# Patient Record
Sex: MALE | Race: BLACK OR AFRICAN AMERICAN | Employment: UNEMPLOYED | URBAN - METROPOLITAN AREA
[De-identification: names, ages, dates, MRNs, and addresses within clinical notes are randomized per-mention and may not be internally consistent; named-entity substitution may affect disease eponyms.]

---

## 2017-06-06 ENCOUNTER — OFFICE VISIT (OUTPATIENT)
Dept: INTERNAL MEDICINE CLINIC | Age: 27
End: 2017-06-06

## 2017-06-06 VITALS
RESPIRATION RATE: 17 BRPM | SYSTOLIC BLOOD PRESSURE: 120 MMHG | HEIGHT: 72 IN | TEMPERATURE: 97.9 F | BODY MASS INDEX: 25.6 KG/M2 | DIASTOLIC BLOOD PRESSURE: 70 MMHG | WEIGHT: 189 LBS | OXYGEN SATURATION: 100 % | HEART RATE: 63 BPM

## 2017-06-06 DIAGNOSIS — S33.5XXA LOW BACK SPRAIN, INITIAL ENCOUNTER: Primary | ICD-10-CM

## 2017-06-06 RX ORDER — NAPROXEN 500 MG/1
500 TABLET ORAL 2 TIMES DAILY WITH MEALS
Qty: 60 TAB | Refills: 3 | Status: SHIPPED | OUTPATIENT
Start: 2017-06-06 | End: 2019-12-10 | Stop reason: SDUPTHER

## 2017-06-06 RX ORDER — CYCLOBENZAPRINE HCL 10 MG
10 TABLET ORAL 2 TIMES DAILY
Qty: 60 TAB | Refills: 3 | Status: SHIPPED | OUTPATIENT
Start: 2017-06-06 | End: 2019-12-10 | Stop reason: SDUPTHER

## 2017-06-06 NOTE — MR AVS SNAPSHOT
Visit Information Date & Time Provider Department Dept. Phone Encounter #  
 6/6/2017  7:45 AM MD Ranjith Freeman Luis Davila [phone] [id number] Follow-up Instructions Return in about 2 weeks (around 6/20/2017), or if symptoms worsen or fail to improve. Upcoming Health Maintenance Date Due Pneumococcal 19-64 Medium Risk (1 of 1 - PPSV23) 4/7/2007 DTaP/Tdap/Td series (1 - Tdap) 4/7/2009 INFLUENZA AGE 9 TO ADULT 8/1/2017 Allergies as of 6/6/2017  Review Complete On: 6/6/2017 By: Precious Chino LPN No Known Allergies Current Immunizations  Never Reviewed No immunizations on file. Not reviewed this visit You Were Diagnosed With   
  
 Codes Comments Low back sprain, initial encounter    -  Primary ICD-10-CM: S33. 9XXA ICD-9-CM: 376. 9 Vitals BP Pulse Temp Resp Height(growth percentile) Weight(growth percentile) 120/70 63 97.9 °F (36.6 °C) (Oral) 17 6' (1.829 m) 189 lb (85.7 kg) SpO2 BMI Smoking Status 100% 25.63 kg/m2 Current Every Day Smoker BMI and BSA Data Body Mass Index Body Surface Area  
 25.63 kg/m 2 2.09 m 2 Your Updated Medication List  
  
   
This list is accurate as of: 6/6/17  8:30 AM.  Always use your most recent med list.  
  
  
  
  
 cyclobenzaprine 10 mg tablet Commonly known as:  FLEXERIL Take 1 Tab by mouth two (2) times a day. naproxen 500 mg tablet Commonly known as:  NAPROSYN Take 1 Tab by mouth two (2) times daily (with meals). Prescriptions Printed Refills  
 naproxen (NAPROSYN) 500 mg tablet 3 Sig: Take 1 Tab by mouth two (2) times daily (with meals). Class: Print Route: Oral  
 cyclobenzaprine (FLEXERIL) 10 mg tablet 3 Sig: Take 1 Tab by mouth two (2) times a day. Class: Print Route: Oral  
  
We Performed the Following REFERRAL TO PHYSICAL THERAPY [VTD90 Custom] Follow-up Instructions Return in about 2 weeks (around 2017), or if symptoms worsen or fail to improve. To-Do List   
 2017 Imaging:  XR SPINE LUMB MIN 4 V Referral Information Referral ID Referred By Referred To [id number] Terrence Cottrell Ottoniel 1213, PT, DPT   
   [address] [facility] [place] Phone: [phone] Fax: [phone] Visits Status Start Date End Date 1 New Request 17 If your referral has a status of pending review or denied, additional information will be sent to support the outcome of this decision. Patient Instructions [employer]hart Activation Thank you for requesting access to WEbook. Please follow the instructions below to securely access and download your online medical record. WEbook allows you to send messages to your doctor, view your test results, renew your prescriptions, schedule appointments, and more. How Do I Sign Up? 1. In your internet browser, go to [email or web address] 
2. Click on the First Time User? Click Here link in the Sign In box. You will be redirect to the New Member Sign Up page. 3. Enter your WEbook Access Code exactly as it appears below. You will not need to use this code after youve completed the sign-up process. If you do not sign up before the expiration date, you must request a new code. WEbook Access Code: [email or web address] Expires: 2017  8:00 AM (This is the date your WEbook access code will ) 4. Enter the last four digits of your Social Security Number (xxxx) and Date of Birth (mm/dd/yyyy) as indicated and click Submit. You will be taken to the next sign-up page. 5. Create a WEbook ID. This will be your WEbook login ID and cannot be changed, so think of one that is secure and easy to remember. 6. Create a WEbook password. You can change your password at any time. 7. Enter your Password Reset Question and Answer. This can be used at a later time if you forget your password. 8. Enter your e-mail address. You will receive e-mail notification when new information is available in [address] 9. Click Sign Up. You can now view and download portions of your medical record. 10. Click the Download Summary menu link to download a portable copy of your medical information. Additional Information If you have questions, please visit the Frequently Asked Questions section of the [employer] website at [email or web address]/. Remember, [employer] is NOT to be used for urgent needs. For medical emergencies, dial 911. Introducing [facility]! [facility] introduces [employer] patient portal. Now you can access parts of your medical record, email your doctor's office, and request medication refills online. 1. In your internet browser, go to [email or web address] 2. Click on the First Time User? Click Here link in the Sign In box. You will see the New Member Sign Up page. 3. Enter your [employer] Access Code exactly as it appears below. You will not need to use this code after youve completed the sign-up process. If you do not sign up before the expiration date, you must request a new code. · [employer] Access Code: [email or web address] Expires: 9/4/2017  8:00 AM 
 
4. Enter the last four digits of your Social Security Number (xxxx) and Date of Birth (mm/dd/yyyy) as indicated and click Submit. You will be taken to the next sign-up page. 5. Create a [employer] ID. This will be your [employer] login ID and cannot be changed, so think of one that is secure and easy to remember. 6. Create a [employer] password. You can change your password at any time. 7. Enter your Password Reset Question and Answer. This can be used at a later time if you forget your password. 8. Enter your e-mail address.  You will receive e-mail notification when new information is available in [employer]. 9. Click Sign Up. You can now view and download portions of your medical record. 10. Click the Download Summary menu link to download a portable copy of your medical information. If you have questions, please visit the Frequently Asked Questions section of the [employer] website. Remember, [employer] is NOT to be used for urgent needs. For medical emergencies, dial 911. Now available from your iPhone and Android! Please provide this summary of care documentation to your next provider. Your primary care clinician is listed as NONE. If you have any questions after today's visit, please call [phone].
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
## 2017-06-06 NOTE — PATIENT INSTRUCTIONS
AMDL Activation    Thank you for requesting access to AMDL. Please follow the instructions below to securely access and download your online medical record. AMDL allows you to send messages to your doctor, view your test results, renew your prescriptions, schedule appointments, and more. How Do I Sign Up? 1. In your internet browser, go to [email or web address]  2. Click on the First Time User? Click Here link in the Sign In box. You will be redirect to the New Member Sign Up page. 3. Enter your AMDL Access Code exactly as it appears below. You will not need to use this code after youve completed the sign-up process. If you do not sign up before the expiration date, you must request a new code. AMDL Access Code: [email or web address]  Expires: 2017  8:00 AM (This is the date your AMDL access code will )    4. Enter the last four digits of your Social Security Number (xxxx) and Date of Birth (mm/dd/yyyy) as indicated and click Submit. You will be taken to the next sign-up page. 5. Create a AMDL ID. This will be your AMDL login ID and cannot be changed, so think of one that is secure and easy to remember. 6. Create a AMDL password. You can change your password at any time. 7. Enter your Password Reset Question and Answer. This can be used at a later time if you forget your password. 8. Enter your e-mail address. You will receive e-mail notification when new information is available in [address] 9. Click Sign Up. You can now view and download portions of your medical record. 10. Click the Download Summary menu link to download a portable copy of your medical information. Additional Information    If you have questions, please visit the Frequently Asked Questions section of the AMDL website at [email or web address] Remember, AMDL is NOT to be used for urgent needs. For medical emergencies, dial 911.

## 2017-06-22 ENCOUNTER — HOSPITAL ENCOUNTER (OUTPATIENT)
Dept: PHYSICAL THERAPY | Age: 27
Discharge: HOME | End: 2017-06-22
Payer: SELF-PAY

## 2017-06-22 PROCEDURE — 97014 ELECTRIC STIMULATION THERAPY: CPT | Performed by: PHYSICAL THERAPIST

## 2017-06-22 PROCEDURE — 97140 MANUAL THERAPY 1/> REGIONS: CPT | Performed by: PHYSICAL THERAPIST

## 2017-06-22 PROCEDURE — 97161 PT EVAL LOW COMPLEX 20 MIN: CPT | Performed by: PHYSICAL THERAPIST

## 2017-06-22 PROCEDURE — 97110 THERAPEUTIC EXERCISES: CPT | Performed by: PHYSICAL THERAPIST
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
## 2017-06-27 ENCOUNTER — HOSPITAL ENCOUNTER (OUTPATIENT)
Dept: PHYSICAL THERAPY | Age: 27
Discharge: HOME | End: 2017-06-27
Payer: SELF-PAY

## 2017-06-27 PROCEDURE — 97014 ELECTRIC STIMULATION THERAPY: CPT

## 2017-06-27 PROCEDURE — 97110 THERAPEUTIC EXERCISES: CPT

## 2017-06-27 PROCEDURE — 97140 MANUAL THERAPY 1/> REGIONS: CPT

## 2017-06-27 NOTE — PROGRESS NOTES
PT DAILY TREATMENT NOTE - [facility] 215    Patient Name: Colleen Campbell  Date:2017  : 1990  [x]  Patient  Verified  Payor: SELF PAY / Plan: [facility] SELF PAY / Product Type: Self Pay /    In time:1:25P  Out time: 2:45P  Total Treatment Time (min): 80  Total Timed Codes (min): 65  1:1 Treatment Time ( only): --   Visit #: 2     Treatment Area: Low back ache [M54.5]    SUBJECTIVE  Pain Level (0-10 scale): -8/10  Any medication changes, allergies to medications, adverse drug reactions, diagnosis change, or new procedure performed?: [x] No    [] Yes (see summary sheet for update)  Subjective functional status/changes:   [] No changes reported  \"I was really sore over the weekend from last week. I feel a little better today but my pain has not improved any.  I have been doing my exercises 2x per day\"    OBJECTIVE    Modality rationale: decrease inflammation, decrease pain and increase tissue extensibility to improve the patients ability to decrease LBP   Min Type Additional Details   15 post [x] Estim: []Att   []Unatt        []TENS instruct                  [x]IFC  []Premod   []NMES                     []Other:  []w/US   []w/ice   [x]w/heat  Position: supine with bolster  Location: back    []  Traction: [] Cervical       []Lumbar                       [] Prone          []Supine                       []Intermittent   []Continuous Lbs:  [] before manual  [] after manual  []w/heat    []  Ultrasound: []Continuous   [] Pulsed at:                           []1MHz   []3MHz Location:  W/cm2:    [] Paraffin         Location:   []w/heat    []  Ice     []  Heat  []  Ice massage Position:  Location:    []  Laser  []  Other: Position:  Location:      []  Vasopneumatic Device Pressure:       [] lo [] med [] hi   Temperature:      [x] Skin assessment post-treatment:  [x]intact []redness- no adverse reaction    []redness  adverse reaction:     50 min Therapeutic Exercise:  [x] See flow sheet :   Rationale: increase ROM, increase strength and improve coordination to improve the patients ability to increase tolerance for ADLs    15 min Manual Therapy: Manual STM to bilateral lumbar paraspinals in prone. Rationale: decrease pain, increase ROM, increase tissue extensibility and decrease trigger points to improve the patients ability to increase lumbar mobility          With   [] TE   [] TA   [] neuro   [] other: Patient Education: [x] Review HEP    [] Progressed/Changed HEP based on:   [] positioning   [] body mechanics   [] transfers   [] heat/ice application    [] other:      Other Objective/Functional Measures: --     Pain Level (0-10 scale) post treatment: 7-8/10    ASSESSMENT/Changes in Function:   Pt reported no decrease in pain with today's session. Pt remained guarded during manual therapy and required frequent VC on relaxing. Patient will continue to benefit from skilled PT services to modify and progress therapeutic interventions, address functional mobility deficits, address ROM deficits, address strength deficits, analyze and address soft tissue restrictions, analyze and cue movement patterns and analyze and modify body mechanics/ergonomics to attain remaining goals. [x]  See Plan of Care  []  See progress note/recertification  []  See Discharge Summary         Progress towards goals / Updated goals:  Short Term Goals: To be accomplished in 3 weeks:  1) Pt will be Independent with HEP  2) Pt will be able to Sit greater than 30 minutes with pain less than 3/10        Long Term Goals:  To be accomplished in 6 weeks:  1) Pt will be able to Sit greater than 60 minutes without pain  2) Pt will be able to Stand greater than 60 minutes without increase of pain  3) Pt will be able to retrieve item form ground without pain  4)  Pt will be able to carry >/= 20 lbs without pain (to simulate carrying his 3year old)       PLAN  [x]  Upgrade activities as tolerated     [x]  Continue plan of care  []  Update interventions per flow sheet       []  Discharge due to:_  []  Other:_      Abdifatah Corona, PTA 6/27/2017  1:25 PM

## 2017-06-29 ENCOUNTER — HOSPITAL ENCOUNTER (OUTPATIENT)
Dept: PHYSICAL THERAPY | Age: 27
Discharge: HOME | End: 2017-06-29
Payer: SELF-PAY

## 2017-06-29 PROCEDURE — 97140 MANUAL THERAPY 1/> REGIONS: CPT | Performed by: PHYSICAL THERAPIST

## 2017-06-29 PROCEDURE — 97014 ELECTRIC STIMULATION THERAPY: CPT | Performed by: PHYSICAL THERAPIST

## 2017-06-29 PROCEDURE — 97110 THERAPEUTIC EXERCISES: CPT | Performed by: PHYSICAL THERAPIST

## 2017-06-29 NOTE — PROGRESS NOTES
PT DAILY TREATMENT NOTE 2-15    Patient Name: Asuncion Mas  Date:2017  : 1990  [x]  Patient  Verified  Payor: SELF PAY / Plan: [facility] SELF PAY / Product Type: Self Pay /    In time: 1:35p  Out time: 2:40p  Total Treatment Time (min): 65  Visit #: 3     Treatment Area: Low back ache [M54.5]    SUBJECTIVE  Pain Level (0-10 scale): 7/10  Any medication changes, allergies to medications, adverse drug reactions, diagnosis change, or new procedure performed?: [x] No    [] Yes (see summary sheet for update)  Subjective functional status/changes:   [] No changes reported  Increased back pain today after lifting at work last night 75 lbs. Enquired about a note.      OBJECTIVE              Modality rationale: decrease inflammation, decrease pain and increase tissue extensibility to improve the patients ability to decrease LBP   Min Type Additional Details   15 post [x] Estim: []Att   []Unatt        []TENS instruct                  [x]IFC  []Premod   []NMES                     []Other:  []w/US   [x]w/ice   []w/heat  Position: supine with bolster  Location: back     []  Traction: [] Cervical       []Lumbar                       [] Prone          []Supine                       []Intermittent   []Continuous Lbs:  [] before manual  [] after manual  []w/heat     []  Ultrasound: []Continuous   [] Pulsed at:                           []1MHz   []3MHz Location:  W/cm2:     [] Paraffin      Location:   []w/heat     []  Ice     []  Heat  []  Ice massage Position:  Location:     []  Laser  []  Other: Position:  Location:     []  Vasopneumatic Device Pressure:       [] lo [] med [] hi   Temperature:       [x] Skin assessment post-treatment:  [x]intact []redness- no adverse reaction    []redness  adverse reaction:      45 min Therapeutic Exercise:  [x] See flow sheet :   Rationale: increase ROM, increase strength and improve coordination to improve the patients ability to increase tolerance for ADLs     15 min Manual Therapy: Manual STM to bilateral lumbar paraspinals in prone. Rationale: decrease pain, increase ROM, increase tissue extensibility and decrease trigger points to improve the patients ability to increase lumbar mobility      With   [] TE   [] TA   [] neuro   [] other: Patient Education: [x] Review HEP    [] Progressed/Changed HEP based on:   [] positioning   [] body mechanics   [] transfers   [] heat/ice application    [] other:       Other Objective/Functional Measures: --                  Pain Level (0-10 scale) post treatment: 7/10     ASSESSMENT/Changes in Function:   Pt reported no decrease in pain with today's session. Pt remained guarded. Encouraged him to get his x-ray.    Patient will continue to benefit from skilled PT services to modify and progress therapeutic interventions, address functional mobility deficits, address ROM deficits, address strength deficits, analyze and address soft tissue restrictions, analyze and cue movement patterns and analyze and modify body mechanics/ergonomics to attain remaining goals.      [x]  See Plan of Care  []  See progress note/recertification  []  See Discharge Summary      Progress towards goals / Updated goals:  Short Term Goals: To be accomplished in 3 weeks:  1) Pt will be Independent with HEP  2) Pt will be able to Sit greater than 30 minutes with pain less than 3/10          Long Term Goals: To be accomplished in 6 weeks:  1) Pt will be able to Sit greater than 60 minutes without pain  2) Pt will be able to Stand greater than 60 minutes without increase of pain  3) Pt will be able to retrieve item form ground without pain  4)  Pt will be able to carry >/= 20 lbs without pain (to simulate carrying his 3year old)        PLAN  [x]  Upgrade activities as tolerated     [x]  Continue plan of care  []  Update interventions per flow sheet       []  Discharge due to:_  []  Other:_      Burgess Vasques 6/29/2017  3:47 PM

## 2017-07-03 ENCOUNTER — HOSPITAL ENCOUNTER (OUTPATIENT)
Dept: GENERAL RADIOLOGY | Age: 27
Discharge: HOME | End: 2017-07-03
Attending: INTERNAL MEDICINE
Payer: SELF-PAY

## 2017-07-03 DIAGNOSIS — S33.5XXA LOW BACK SPRAIN, INITIAL ENCOUNTER: ICD-10-CM

## 2017-07-03 PROCEDURE — 72100 X-RAY EXAM L-S SPINE 2/3 VWS: CPT

## 2017-07-05 ENCOUNTER — HOSPITAL ENCOUNTER (OUTPATIENT)
Dept: PHYSICAL THERAPY | Age: 27
Discharge: HOME | End: 2017-07-05
Payer: SELF-PAY

## 2017-07-05 PROCEDURE — 97014 ELECTRIC STIMULATION THERAPY: CPT | Performed by: PHYSICAL THERAPIST

## 2017-07-05 PROCEDURE — 97140 MANUAL THERAPY 1/> REGIONS: CPT | Performed by: PHYSICAL THERAPIST

## 2017-07-05 PROCEDURE — 97110 THERAPEUTIC EXERCISES: CPT | Performed by: PHYSICAL THERAPIST

## 2017-07-05 NOTE — PROGRESS NOTES
PT DAILY TREATMENT NOTE 2-15    Patient Name: Bhavin Urenas  Date:2017  : 1990  [x]  Patient  Verified  Payor: SELF PAY / Plan: [facility] SELF PAY / Product Type: Self Pay /    In time: 10:10a Out time:11:20a  Total Treatment Time (min): 70  Visit #: 4     Treatment Area: Low back ache [M54.5]    SUBJECTIVE  Pain Level (0-10 scale):7/10  Any medication changes, allergies to medications, adverse drug reactions, diagnosis change, or new procedure performed?: [x] No    [] Yes (see summary sheet for update)  Subjective functional status/changes:   [] No changes reported  I think you are right, have not had a chance to rest and I think that is making a difference. Pain levels staying about the same. 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 Had x-ray on monday    OBJECTIVE    Modality rationale: decrease inflammation, decrease pain and increase tissue extensibility to improve the patients ability to tolerated daily activities with less pain.     Min Type Additional Details   15 [x] Estim: []Att   []Unatt        []TENS instruct                  [x]IFC  []Premod   []NMES                     []Other:  []w/US   []w/ice   [x]w/heat  Position: prone  Location: lumbar/lower thoracic    []  Traction: [] Cervical       []Lumbar                       [] Prone          []Supine                       []Intermittent   []Continuous Lbs:  [] before manual  [] after manual  []w/heat    []  Ultrasound: []Continuous   [] Pulsed at:                            []1MHz   []3MHz Location:  W/cm2:    []  Paraffin         Location:  []w/heat    []  Ice     []  Heat  []  Ice massage Position:  Location:    []  Laser  []  Other: Position:  Location:    []  Vasopneumatic Device Pressure:       [] lo [] med [] hi   Temperature:    [x] Skin assessment post-treatment:  [x]intact []redness- no adverse reaction    []redness  adverse reaction:     30 min Therapeutic Exercise:  [x] See flow sheet :   Rationale: increase ROM, increase strength, improve balance and increase proprioception to improve the patients ability to tolerate daily and work activities. 15 Min Manual Therapy:  Manual STM to bilateral paraspinals. Rationale: decrease pain, increase ROM, increase tissue extensibility and decrease trigger points  to improve the patients ability to tolerate daily and work related activities. With   [] TE   [] TA   [] neuro   [] other: Patient Education: [x] Review HEP    [] Progressed/Changed HEP based on:   [] positioning   [] body mechanics   [] transfers   [] heat/ice application    [] other:      Other Objective/Functional Measures: -     Pain Level (0-10 scale) post treatment: 7/10    ASSESSMENT/Changes in Function:   Pain levels remain about the same at 7/10. No change with session, but does report that is helps some. X-ray report reveals no abnormalities in the spine. Patient will continue to benefit from skilled PT services to modify and progress therapeutic interventions, address ROM deficits, address strength deficits, analyze and address soft tissue restrictions, analyze and modify body mechanics/ergonomics and assess and modify postural abnormalities to attain remaining goals.      [x]  See Plan of Care  []  See progress note/recertification  []  See Discharge Summary         Progress towards goals / Updated goals:  1) Pt will be able to Sit greater than  60 minutes without pain  2) Pt will be able to Stand greater than  60 minutes without increase of pain  3) Pt will be able to Ambulate greater than 3 blocks without increase of pain  4) Pt will be able to retrieve item form ground without pain  5) Pt will be able to carry >/= 40 lbs without pain        PLAN  []  Upgrade activities as tolerated     [x]  Continue plan of care  []  Update interventions per flow sheet       []  Discharge due to:_  []  Other:_      Estella Hurst 7/5/2017  12:18 PM

## 2017-07-07 ENCOUNTER — APPOINTMENT (OUTPATIENT)
Dept: PHYSICAL THERAPY | Age: 27
End: 2017-07-07
Payer: SELF-PAY

## 2017-07-11 ENCOUNTER — HOSPITAL ENCOUNTER (OUTPATIENT)
Dept: PHYSICAL THERAPY | Age: 27
Discharge: HOME | End: 2017-07-11
Payer: SELF-PAY

## 2017-07-11 PROCEDURE — 97014 ELECTRIC STIMULATION THERAPY: CPT | Performed by: PHYSICAL MEDICINE & REHABILITATION

## 2017-07-11 PROCEDURE — 97110 THERAPEUTIC EXERCISES: CPT | Performed by: PHYSICAL MEDICINE & REHABILITATION

## 2017-07-11 PROCEDURE — 97140 MANUAL THERAPY 1/> REGIONS: CPT | Performed by: PHYSICAL MEDICINE & REHABILITATION

## 2017-07-11 NOTE — PROGRESS NOTES
PT DAILY TREATMENT NOTE - [facility] 2-15    Patient Name: Francisco J Gomes  Date:2017  : 1990  [x]  Patient  Verified  Payor: SELF PAY / Plan: [facility] SELF [facility]Y / Product Type: Self Pay /    In time:215 pm  Out time:330 pm  Total Treatment Time (min): 75  Total Timed Codes (min): 60  1:1 Treatment Time ( only): -   Visit #: 5     Treatment Area: Low back ache [M54.5]    SUBJECTIVE  Pain Level (0-10 scale): 6/10  Any medication changes, allergies to medications, adverse drug reactions, diagnosis change, or new procedure performed?: [x] No    [] Yes (see summary sheet for update)  Subjective functional status/changes:   [] No changes reported  Pt reported he is doing a little better but still having muscle spasms.     OBJECTIVE    Modality rationale: decrease inflammation, decrease pain and increase tissue extensibility to improve the patients ability to ADLs, standing, walking tolerance   Min Type Additional Details   15 [x] Estim: []Att   [x]Unatt        []TENS instruct                  [x]IFC  []Premod   []NMES                     []Other:  []w/US   []w/ice   [x]w/heat  Position: supine  Location: low back    []  Traction: [] Cervical       []Lumbar                       [] Prone          []Supine                       []Intermittent   []Continuous Lbs:  [] before manual  [] after manual  []w/heat    []  Ultrasound: []Continuous   [] Pulsed at:                           []1MHz   []3MHz Location:  W/cm2:    [] Paraffin         Location:   []w/heat    []  Ice     []  Heat  []  Ice massage Position:  Location:    []  Laser  []  Other: Position:  Location:      []  Vasopneumatic Device Pressure:       [] lo [] med [] hi   Temperature:      [x] Skin assessment post-treatment:  [x]intact []redness- no adverse reaction    []redness  adverse reaction:     45 min Therapeutic Exercise:  [x] See flow sheet :   Rationale: increase ROM, increase strength, improve coordination, improve balance and increase proprioception to improve the patients ability to ADLs, standing tolerance    15 min Manual Therapy:  MET to correct L posterior rotated innominate   Rationale: decrease pain, increase ROM and increase tissue extensibility to improve the patients ability to ADLs, standing tolerance          With   [x] TE   [] TA   [] neuro   [] other: Patient Education: [x] Review HEP    [] Progressed/Changed HEP based on:   [] positioning   [] body mechanics   [] transfers   [] heat/ice application    [] other:      Other Objective/Functional Measures:   Pt tolerated today's standing therex well with mod fatigue present due to weakness. Pain Level (0-10 scale) post treatment: 6/10    ASSESSMENT/Changes in Function:     Patient will continue to benefit from skilled PT services to modify and progress therapeutic interventions, address functional mobility deficits, address ROM deficits, address strength deficits, analyze and address soft tissue restrictions, analyze and cue movement patterns, analyze and modify body mechanics/ergonomics and assess and modify postural abnormalities to attain remaining goals. []  See Plan of Care  []  See progress note/recertification  []  See Discharge Summary         Progress towards goals / Updated goals:  Pt demonstrated hip weakness and fatigued very quickly. Would continue to benefit from LE and core stability to allow for improvement in work tasks.     PLAN  [x]  Upgrade activities as tolerated     [x]  Continue plan of care  [x]  Update interventions per flow sheet       []  Discharge due to:_  []  Other:_      Sammi Parrish, JOSE, CPT 7/11/2017  5:07 PM

## 2017-07-18 ENCOUNTER — HOSPITAL ENCOUNTER (OUTPATIENT)
Dept: PHYSICAL THERAPY | Age: 27
Discharge: HOME | End: 2017-07-18
Payer: SELF-PAY

## 2017-07-18 PROCEDURE — 97110 THERAPEUTIC EXERCISES: CPT | Performed by: PHYSICAL THERAPIST

## 2017-07-18 PROCEDURE — 97140 MANUAL THERAPY 1/> REGIONS: CPT | Performed by: PHYSICAL THERAPIST

## 2017-07-18 NOTE — PROGRESS NOTES
PT DAILY TREATMENT NOTE 2-15    Patient Name: Anjana Soraes  Date:2017  : 1990  [x]  Patient  Verified  Payor: SELF PAY / [facility] / Product Type: Self Pay /    In time: 2:00p  Out time: 3:10p  Total Treatment Time (min): 70  Visit #: 5    Treatment Area: Low back ache [M54.5]    SUBJECTIVE  Pain Level (0-10 scale): 610  Any medication changes, allergies to medications, adverse drug reactions, diagnosis change, or new procedure performed?: [x] No    [] Yes (see summary sheet for update)  Subjective functional status/changes:   [] No changes reported  The pt reports pain is about the same since last session. Busy at work that does not help pain levels calm down.      OBJECTIVE           Modality rationale: decrease inflammation, decrease pain and increase tissue extensibility to improve the patients ability to ADLs, standing, walking tolerance   Min Type Additional Details   - [x] Estim: []Att   [x]Unatt        []TENS instruct                  [x]IFC  []Premod   []NMES                     []Other:  []w/US   []w/ice   [x]w/heat  Position: supine  Location: low back     []  Traction: [] Cervical       []Lumbar                       [] Prone          []Supine                       []Intermittent   []Continuous Lbs:  [] before manual  [] after manual  []w/heat     []  Ultrasound: []Continuous   [] Pulsed at:                           []1MHz   []3MHz Location:  W/cm2:     [] Paraffin      Location:   []w/heat    post 10 [x]  Ice     []  Heat  []  Ice massage Position: supine  Location: back     []  Laser  []  Other: Position:  Location:     []  Vasopneumatic Device Pressure:       [] lo [] med [] hi   Temperature:       [x] Skin assessment post-treatment:  [x]intact []redness- no adverse reaction    []redness  adverse reaction:      45 min Therapeutic Exercise:  [x] See flow sheet :   Rationale: increase ROM, increase strength, improve coordination, improve balance and increase proprioception to improve the patients ability to ADLs, standing tolerance     15 min Manual Therapy:  SIJ assessment, symmetrical. STM to left lumbar paraspinals in prone. Rationale: decrease pain, increase ROM and increase tissue extensibility to improve the patients ability to ADLs, standing tolerance      With   [x] TE   [] TA   [] neuro   [] other: Patient Education: [x] Review HEP    [] Progressed/Changed HEP based on:   [] positioning   [] body mechanics   [] transfers   [] heat/ice application    [] other:       Other Objective/Functional Measures:   ROM was much improved with  Flexion to 75%, extension 75% full side bending and 75% rotation.               Pain Level (0-10 scale) post treatment: 0/10 (numb from ice)     ASSESSMENT/Changes in Function:    Pt tolerated addition to med ball walk, prayer stretch and miniband side stepping. Progressed balance and working on core stabilization with squat and strengthening. Improved ROM noted since initial session with muscle guarding noted to be significantly decreased.    Patient will continue to benefit from skilled PT services to modify and progress therapeutic interventions, address functional mobility deficits, address ROM deficits, address strength deficits, analyze and address soft tissue restrictions, analyze and cue movement patterns, analyze and modify body mechanics/ergonomics and assess and modify postural abnormalities to attain remaining goals.      [x]  See Plan of Care  []  See progress note/recertification  []  See Discharge Summary      Progress towards goals / Updated goals:  Would continue to benefit from LE and core stability to allow for improvement in work tasks.     PLAN  [x]  Upgrade activities as tolerated     [x]  Continue plan of care  [x]  Update interventions per flow sheet       []  Discharge due to:_  []  Other:_      Grant Daily 7/18/2017  4:09 PM

## 2017-07-18 NOTE — PROGRESS NOTES
[employer] [facility]  [address]  [facility], [address]  Phone: [phone]      Fax:  [phone]    Progress Note    Name: Jesus Barillas   : 1990   MD: Corrie Greene MD       Treatment Diagnosis: Low back ache [M54.5]  Start of Care: 17    Visits from Start of Care: 5  Missed Visits: 1    Summary of Care: The pt has been seen for 5 sessions at Physical Therapy at [facility]. Pt has progressed with lumbar ROM, progression of core and LE Strengthening, and improved tolerance to daily activity He has decreased muscle turgor and spasms since the initiation of PT. . Pain levels continue to be 6/10, however patient reports minimal sleep, rest, and working 2 jobs that impacts his pain levels. Assessment / Recommendations: Overall, he has made progress in physical therapy and would benefit form 2-4 more weeks. Progress towards goals / Updated goals: Progressing towards goals. Pain levels are still reported to be 6/10  1) Pt will be able to Sit greater than  60 minutes without pain  2) Pt will be able to Stand greater than  60 minutes without increase of pain  3) Pt will be able to Ambulate greater than 3 blocks without increase of pain  4) Pt will be able to retrieve item form ground without pain  5) Pt will be able to carry >/= 40 lbs without pain       Other: progress 2-4 more weeks      Tracy Almaguer 2017 6:23 PM    ________________________________________________________________________  NOTE TO PHYSICIAN:  Please complete the following and fax to: [facility]: [phone]  . Retain this original for your records. If you are unable to process this request in 24 hours, please contact our office.        ____ I have read the above report and request that my patient continue therapy with the following changes/special instructions:  ____ I have read the above report and request that my patient be discharged from therapy    Physician's Signature:_________________ Date:___________Time:__________

## 2017-07-19 ENCOUNTER — OFFICE VISIT (OUTPATIENT)
Dept: INTERNAL MEDICINE CLINIC | Age: 27
End: 2017-07-19

## 2017-07-19 VITALS
WEIGHT: 185 LBS | DIASTOLIC BLOOD PRESSURE: 90 MMHG | BODY MASS INDEX: 25.06 KG/M2 | RESPIRATION RATE: 16 BRPM | HEART RATE: 76 BPM | SYSTOLIC BLOOD PRESSURE: 130 MMHG | OXYGEN SATURATION: 98 % | TEMPERATURE: 98.3 F | HEIGHT: 72 IN

## 2017-07-19 DIAGNOSIS — S33.5XXD LOW BACK SPRAIN, SUBSEQUENT ENCOUNTER: Primary | ICD-10-CM

## 2017-07-19 NOTE — PATIENT INSTRUCTIONS
[employer] Activation    Thank you for requesting access to [facility]. Please follow the instructions below to securely access and download your online medical record. [facility] allows you to send messages to your doctor, view your test results, renew your prescriptions, schedule appointments, and more. How Do I Sign Up? 1. In your internet browser, go to [email or web address]  2. Click on the First Time User? Click Here link in the Sign In box. You will be redirect to the New Member Sign Up page. 3. Enter your [facility] Access Code exactly as it appears below. You will not need to use this code after youve completed the sign-up process. If you do not sign up before the expiration date, you must request a new code. [facility] Access Code: Activation code not generated  Current [facility] Status: Patient Declined (This is the date your [facility] access code will )    4. Enter the last four digits of your Social Security Number (xxxx) and Date of Birth (mm/dd/yyyy) as indicated and click Submit. You will be taken to the next sign-up page. 5. Create a [facility] ID. This will be your [facility] login ID and cannot be changed, so think of one that is secure and easy to remember. 6. Create a [facility] password. You can change your password at any time. 7. Enter your Password Reset Question and Answer. This can be used at a later time if you forget your password. 8. Enter your e-mail address. You will receive e-mail notification when new information is available in [address] 9. Click Sign Up. You can now view and download portions of your medical record. 10. Click the Download Summary menu link to download a portable copy of your medical information. Additional Information    If you have questions, please visit the Frequently Asked Questions section of the [employer] website at [email or web address] Remember, [employer] is NOT to be used for urgent needs. For medical emergencies, dial 911.
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
## 2017-07-19 NOTE — MR AVS SNAPSHOT
Visit Information Date & Time Provider Department Dept. Phone Encounter #  
 7/19/2017  2:30 PM Lizy Hanks MD [address] [place] [address] [phone] [id number] Follow-up Instructions Return in about 2 weeks (around 8/2/2017), or if symptoms worsen or fail to improve. Upcoming Health Maintenance Date Due Pneumococcal 19-64 Medium Risk (1 of 1 - PPSV23) 4/7/2007 DTaP/Tdap/Td series (1 - Tdap) 4/7/2009 INFLUENZA AGE 9 TO ADULT 8/1/2017 Allergies as of 7/19/2017  Review Complete On: 7/19/2017 By: Lizy Hanks MD  
 No Known Allergies Current Immunizations  Never Reviewed No immunizations on file. Not reviewed this visit You Were Diagnosed With   
  
 Codes Comments Low back sprain, subsequent encounter    -  Primary ICD-10-CM: S33. 9XXD ICD-9-CM: V58.89, 846.9 Vitals BP Pulse Temp Resp Height(growth percentile) Weight(growth percentile) 130/90 (BP 1 Location: Right arm, BP Patient Position: Sitting) 76 98.3 °F (36.8 °C) (Oral) 16 6' (1.829 m) 185 lb (83.9 kg) SpO2 BMI Smoking Status 98% 25.09 kg/m2 Current Every Day Smoker Vitals History BMI and BSA Data Body Mass Index Body Surface Area 25.09 kg/m 2 2.06 m 2 Your Updated Medication List  
  
   
This list is accurate as of: 7/19/17  2:44 PM.  Always use your most recent med list.  
  
  
  
  
 cyclobenzaprine 10 mg tablet Commonly known as:  FLEXERIL Take 1 Tab by mouth two (2) times a day. naproxen 500 mg tablet Commonly known as:  NAPROSYN Take 1 Tab by mouth two (2) times daily (with meals). Follow-up Instructions Return in about 2 weeks (around 8/2/2017), or if symptoms worsen or fail to improve. To-Do List   
 07/20/2017 9:30 AM  
  Appointment with Donald Person at [facility] PT Brenda Luna ([phone]) Patient Instructions MyChart Activation Thank you for requesting access to link bird. Please follow the instructions below to securely access and download your online medical record. [employer] allows you to send messages to your doctor, view your test results, renew your prescriptions, schedule appointments, and more. How Do I Sign Up? 1. In your internet browser, go to [email or web address] 
2. Click on the First Time User? Click Here link in the Sign In box. You will be redirect to the New Member Sign Up page. 3. Enter your link bird Access Code exactly as it appears below. You will not need to use this code after youve completed the sign-up process. If you do not sign up before the expiration date, you must request a new code. [employer] Access Code: Activation code not generated Current link bird Status: Patient Declined (This is the date your [employer] access code will ) 4. Enter the last four digits of your Social Security Number (xxxx) and Date of Birth (mm/dd/yyyy) as indicated and click Submit. You will be taken to the next sign-up page. 5. Create a link bird ID. This will be your link bird login ID and cannot be changed, so think of one that is secure and easy to remember. 6. Create a link bird password. You can change your password at any time. 7. Enter your Password Reset Question and Answer. This can be used at a later time if you forget your password. 8. Enter your e-mail address. You will receive e-mail notification when new information is available in [address] 9. Click Sign Up. You can now view and download portions of your medical record. 10. Click the Download Summary menu link to download a portable copy of your medical information. Additional Information If you have questions, please visit the Frequently Asked Questions section of the link bird website at [email or web address] Remember, [employer] is NOT to be used for urgent needs. For medical emergencies, dial 911. Please provide this summary of care documentation to your next provider. Your primary care clinician is listed as NONE. If you have any questions after today's visit, please call [phone].

## 2017-07-20 ENCOUNTER — HOSPITAL ENCOUNTER (OUTPATIENT)
Dept: PHYSICAL THERAPY | Age: 27
Discharge: HOME | End: 2017-07-20
Payer: SELF-PAY

## 2017-07-20 PROCEDURE — 97140 MANUAL THERAPY 1/> REGIONS: CPT | Performed by: PHYSICAL THERAPIST

## 2017-07-20 PROCEDURE — 97110 THERAPEUTIC EXERCISES: CPT | Performed by: PHYSICAL THERAPIST

## 2017-07-20 NOTE — PROGRESS NOTES
PT DAILY TREATMENT NOTE 2-15    Patient Name: Darnell Acosta  Date:2017  : 1990  [x]  Patient  Verified  Payor: SELF PAY / Plan: [facility] SELF PAY / Product Type: Self Pay /    In time: 9:30A  Out time: 10:20A  Total Treatment Time (min): 50  Visit #: 6    Treatment Area: Low back ache [M54.5]    SUBJECTIVE  Pain Level (0-10 scale): 3-4/10  Any medication changes, allergies to medications, adverse drug reactions, diagnosis change, or new procedure performed?: [x] No    [] Yes (see summary sheet for update)  Subjective functional status/changes:   [] No changes reported  Overall feeling better and having only 3-4/10 pain today. Doctor wants me to continue for 2 more weeks.      OBJECTIVE                Modality rationale: decrease inflammation, decrease pain and increase tissue extensibility to improve the patients ability to ADLs, standing, walking tolerance   Min Type Additional Details   - [x] Estim: []Att   [x]Unatt        []TENS instruct                  [x]IFC  []Premod   []NMES                     []Other:  []w/US   []w/ice   [x]w/heat  Position: supine  Location: low back      []  Traction: [] Cervical       []Lumbar                       [] Prone          []Supine                       []Intermittent   []Continuous Lbs:  [] before manual  [] after manual  []w/heat      []  Ultrasound: []Continuous   [] Pulsed at:                           []1MHz   []3MHz Location:  W/cm2:      [] Paraffin      Location:   []w/heat   - [x]  Ice     []  Heat  []  Ice massage Position: supine  Location: back      []  Laser  []  Other: Position:  Location:      []  Vasopneumatic Device Pressure:       [] lo [] med [] hi   Temperature:        [x] Skin assessment post-treatment:  [x]intact []redness- no adverse reaction    []redness  adverse reaction:       30 min Therapeutic Exercise:  [x] See flow sheet :   Rationale: increase ROM, increase strength, improve coordination, improve balance and increase proprioception to improve the patients ability to ADLs, standing tolerance      15 min Manual Therapy:  SIJ assessment, symmetrical. STM to left lumbar paraspinals in prone. Rationale: decrease pain, increase ROM and increase tissue extensibility to improve the patients ability to ADLs, standing tolerance      With   [x] TE   [] TA   [] neuro   [] other: Patient Education: [x] Review HEP    [] Progressed/Changed HEP based on:   [] positioning   [] body mechanics   [] transfers   [] heat/ice application    [] other:        Other Objective/Functional Measures: -       Pain Level (0-10 scale) post treatment: 3/10      ASSESSMENT/Changes in Function:   Pt is improving each session and making progress towards all long term goals. Feel significant improvement in his turgor in lumbar paraspinal muscles with less pain and improved ROM. Continue to progress core stabilization and strengthening as he tolerates.      Patient will continue to benefit from skilled PT services to modify and progress therapeutic interventions, address functional mobility deficits, address ROM deficits, address strength deficits, analyze and address soft tissue restrictions, analyze and cue movement patterns, analyze and modify body mechanics/ergonomics and assess and modify postural abnormalities to attain remaining goals.      [x]  See Plan of Care  []  See progress note/recertification  []  See Discharge Summary      Progress towards goals / Updated goals:PROGRESSING  1) Pt will be able to Sit greater than  60 minutes without pain partially MET  2) Pt will be able to Stand greater than  60 minutes without increase of pain Partially MET  3) Pt will be able to Ambulate greater than 3 blocks without increase of pain MET  4) Pt will be able to retrieve item form ground without pain  5) Pt will be able to carry >/= 40 lbs without pain                                               PLAN  [x]  Upgrade activities as tolerated     [x]  Continue plan of care  [x]  Update interventions per flow sheet       []  Discharge due to:_  []  Other:_      Ricardo Campoverde 7/20/2017  12:03 PM

## 2017-07-25 ENCOUNTER — HOSPITAL ENCOUNTER (OUTPATIENT)
Dept: PHYSICAL THERAPY | Age: 27
Discharge: HOME | End: 2017-07-25
Payer: SELF-PAY

## 2017-07-25 PROCEDURE — 97140 MANUAL THERAPY 1/> REGIONS: CPT | Performed by: PHYSICAL MEDICINE & REHABILITATION

## 2017-07-25 PROCEDURE — 97014 ELECTRIC STIMULATION THERAPY: CPT | Performed by: PHYSICAL MEDICINE & REHABILITATION

## 2017-07-25 PROCEDURE — 97110 THERAPEUTIC EXERCISES: CPT | Performed by: PHYSICAL MEDICINE & REHABILITATION

## 2017-07-25 NOTE — PROGRESS NOTES
PT DAILY TREATMENT NOTE 2-15    Patient Name: Anjana Soares  Date:2017  : 1990  [x]  Patient  Verified  Payor: SELF PAY / Plan: [facility] SELF PAY / Product Type: Self Pay /    In time: 230 pm  Out time: 340 pm  Total Treatment Time (min): 70  Visit #: 8    Treatment Area: Low back ache [M54.5]    SUBJECTIVE  Pain Level (0-10 scale): 5/10  Any medication changes, allergies to medications, adverse drug reactions, diagnosis change, or new procedure performed?: [x] No    [] Yes (see summary sheet for update)  Subjective functional status/changes:   [] No changes reported  Pt reported that he is feeling better but still hurting along the left low back    OBJECTIVE                Modality rationale: decrease inflammation, decrease pain and increase tissue extensibility to improve the patients ability to ADLs, standing, walking tolerance   Min Type Additional Details   15 [x] Estim: []Att   [x]Unatt        []TENS instruct                  [x]IFC  []Premod   []NMES                     []Other:  []w/US   []w/ice   [x]w/heat  Position: R SL  Location: L QL      []  Traction: [] Cervical       []Lumbar                       [] Prone          []Supine                       []Intermittent   []Continuous Lbs:  [] before manual  [] after manual  []w/heat      []  Ultrasound: []Continuous   [] Pulsed at:                           []1MHz   []3MHz Location:  W/cm2:      [] Paraffin      Location:   []w/heat    []  Ice     []  Heat  []  Ice massage Position:   Location:       []  Laser  []  Other: Position:  Location:      []  Vasopneumatic Device Pressure:       [] lo [] med [] hi   Temperature:        [x] Skin assessment post-treatment:  [x]intact []redness- no adverse reaction    []redness  adverse reaction:       40 min Therapeutic Exercise:  [x] See flow sheet :   Rationale: increase ROM, increase strength, improve coordination, improve balance and increase proprioception to improve the patients ability to ADLs, standing tolerance      15 min Manual Therapy:  SIJ assessment, MET needed.  STM to left QL in R SL   Rationale: decrease pain, increase ROM and increase tissue extensibility to improve the patients ability to ADLs, standing tolerance      With   [x] TE   [] TA   [] neuro   [] other: Patient Education: [x] Review HEP    [] Progressed/Changed HEP based on:   [] positioning   [] body mechanics   [] transfers   [] heat/ice application    [] other:        Other Objective/Functional Measures: -   Max TTP along L QL    Pain Level (0-10 scale) post treatment: \"sore\"      ASSESSMENT/Changes in Function:   Patient will continue to benefit from skilled PT services to modify and progress therapeutic interventions, address functional mobility deficits, address ROM deficits, address strength deficits, analyze and address soft tissue restrictions, analyze and cue movement patterns, analyze and modify body mechanics/ergonomics and assess and modify postural abnormalities to attain remaining goals.      []  See Plan of Care  []  See progress note/recertification  []  See Discharge Summary      Progress towards goals / Updated goals:PROGRESSING  Pt is progressing well towards goals but continues to have L QL tightness present.                                     PLAN  [x]  Upgrade activities as tolerated     [x]  Continue plan of care  [x]  Update interventions per flow sheet       []  Discharge due to:_  []  Other:_      Nancy Mac PTA, CPT 7/25/2017  12:03 PM

## 2017-07-27 ENCOUNTER — HOSPITAL ENCOUNTER (OUTPATIENT)
Dept: PHYSICAL THERAPY | Age: 27
Discharge: HOME | End: 2017-07-27
Payer: SELF-PAY

## 2017-07-27 PROCEDURE — 97140 MANUAL THERAPY 1/> REGIONS: CPT

## 2017-07-27 PROCEDURE — 97110 THERAPEUTIC EXERCISES: CPT

## 2017-07-27 PROCEDURE — 97014 ELECTRIC STIMULATION THERAPY: CPT

## 2017-07-27 NOTE — PROGRESS NOTES
PT DAILY TREATMENT NOTE 2-15    Patient Name: María Mckeon  Date:2017  : 1990  [x]  Patient  Verified  Payor: SELF PAY / Plan: [facility] SELF PAY / Product Type: Self Pay /    In time: 310p  Out time: 420p  Total Treatment Time (min): 70  Visit #: 9    Treatment Area: Low back ache [M54.5]    SUBJECTIVE  Pain Level (0-10 scale): 6/10  Any medication changes, allergies to medications, adverse drug reactions, diagnosis change, or new procedure performed?: [x] No    [] Yes (see summary sheet for update)  Subjective functional status/changes:   [] No changes reported  Patient reports that he is more sore and tender along L QL area after last session.     OBJECTIVE      Modality rationale: decrease inflammation, decrease pain and increase tissue extensibility to improve the patients ability to ADLs, standing, walking tolerance   Min Type Additional Details   15 [x] Estim: []Att   [x]Unatt        []TENS instruct                  [x]IFC  []Premod   []NMES                     []Other:  []w/US   []w/ice   [x]w/heat  Position: R SL  Location: L QL      []  Traction: [] Cervical       []Lumbar                       [] Prone          []Supine                       []Intermittent   []Continuous Lbs:  [] before manual  [] after manual  []w/heat      []  Ultrasound: []Continuous   [] Pulsed at:                           []1MHz   []3MHz Location:  W/cm2:      [] Paraffin      Location:   []w/heat    []  Ice     []  Heat  []  Ice massage Position:   Location:       []  Laser  []  Other: Position:  Location:      []  Vasopneumatic Device Pressure:       [] lo [] med [] hi   Temperature:        [x] Skin assessment post-treatment:  [x]intact []redness- no adverse reaction    []redness  adverse reaction:       40 min Therapeutic Exercise:  [x] See flow sheet :   Rationale: increase ROM, increase strength, improve coordination, improve balance and increase proprioception to improve the patients ability to ADLs, standing tolerance      15 min Manual Therapy:  STM to left QL in prone, L QL stretching in R SL   Rationale: decrease pain, increase ROM and increase tissue extensibility to improve the patients ability to ADLs, standing tolerance      With   [x] TE   [] TA   [] neuro   [] other: Patient Education: [x] Review HEP    [] Progressed/Changed HEP based on:   [] positioning   [] body mechanics   [] transfers   [] heat/ice application    [] other:        Other Objective/Functional Measures:      Pain Level (0-10 scale) post treatment: 5      ASSESSMENT/Changes in Function:     Patient will continue to benefit from skilled PT services to modify and progress therapeutic interventions, address functional mobility deficits, address ROM deficits, address strength deficits, analyze and address soft tissue restrictions, analyze and cue movement patterns, analyze and modify body mechanics/ergonomics and assess and modify postural abnormalities to attain remaining goals.      []  See Plan of Care  []  See progress note/recertification  []  See Discharge Summary      Progress towards goals / Updated goals: Patient demonstrates good tolerance with exercises and is progressing towards goals.  Patient continues to have increased tissue turgor in R QL.                                PLAN  [x]  Upgrade activities as tolerated     [x]  Continue plan of care  [x]  Update interventions per flow sheet       []  Discharge due to:_  []  Other:_      Avila Sepulveda PTA 7/27/2017  3:13 PM

## 2017-08-01 ENCOUNTER — APPOINTMENT (OUTPATIENT)
Dept: PHYSICAL THERAPY | Age: 27
End: 2017-08-01
Payer: SELF-PAY

## 2017-08-03 ENCOUNTER — HOSPITAL ENCOUNTER (OUTPATIENT)
Dept: PHYSICAL THERAPY | Age: 27
Discharge: HOME | End: 2017-08-03
Payer: SELF-PAY

## 2017-08-03 PROCEDURE — 97110 THERAPEUTIC EXERCISES: CPT | Performed by: PHYSICAL MEDICINE & REHABILITATION

## 2017-08-03 PROCEDURE — 97140 MANUAL THERAPY 1/> REGIONS: CPT | Performed by: PHYSICAL MEDICINE & REHABILITATION

## 2017-08-03 NOTE — PROGRESS NOTES
PT DAILY TREATMENT NOTE 2-15    Patient Name: Jessee Guan  Date:8/3/2017  : 1990  [x]  Patient  Verified  Payor: SELF PAY / Plan: [facility] SELF PAY / Product Type: Self Pay /    In time: 300p  Out time: 410p  Total Treatment Time (min): 70  Visit #: 10    Treatment Area: Low back ache [M54.5]    SUBJECTIVE  Pain Level (0-10 scale): 6/10  Any medication changes, allergies to medications, adverse drug reactions, diagnosis change, or new procedure performed?: [x] No    [] Yes (see summary sheet for update)  Subjective functional status/changes:   [] No changes reported  Patient reports that he continues to have the L sided back pain.     OBJECTIVE      Modality rationale: decrease inflammation, decrease pain and increase tissue extensibility to improve the patients ability to ADLs, standing, walking tolerance   Min Type Additional Details    [] Estim: []Att   []Unatt        []TENS instruct                  []IFC  []Premod   []NMES                     []Other:  []w/US   []w/ice   []w/heat  Position:   Location:       []  Traction: [] Cervical       []Lumbar                       [] Prone          []Supine                       []Intermittent   []Continuous Lbs:  [] before manual  [] after manual  []w/heat      []  Ultrasound: []Continuous   [] Pulsed at:                           []1MHz   []3MHz Location:  W/cm2:      [] Paraffin      Location:   []w/heat   10 []  Ice     [x]  Heat  []  Ice massage Position: supine   Location: Low back      []  Laser  []  Other: Position:  Location:      []  Vasopneumatic Device Pressure:       [] lo [] med [] hi   Temperature:        [x] Skin assessment post-treatment:  [x]intact []redness- no adverse reaction    []redness  adverse reaction:       45 min Therapeutic Exercise:  [x] See flow sheet :   Rationale: increase ROM, increase strength, improve coordination, improve balance and increase proprioception to improve the patients ability to ADLs, standing tolerance      10 min Manual Therapy:  STM to left QL in R SL, L QL stretching in R SL   Rationale: decrease pain, increase ROM and increase tissue extensibility to improve the patients ability to ADLs, standing tolerance      With   [x] TE   [] TA   [] neuro   [] other: Patient Education: [x] Review HEP    [] Progressed/Changed HEP based on:   [] positioning   [] body mechanics   [] transfers   [] heat/ice application    [] other:        Other Objective/Functional Measures:    Significant TTP along L QL    Pain Level (0-10 scale) post treatment: 6      ASSESSMENT/Changes in Function:     Patient will continue to benefit from skilled PT services to modify and progress therapeutic interventions, address functional mobility deficits, address ROM deficits, address strength deficits, analyze and address soft tissue restrictions, analyze and cue movement patterns, analyze and modify body mechanics/ergonomics and assess and modify postural abnormalities to attain remaining goals.      []  See Plan of Care  []  See progress note/recertification  []  See Discharge Summary      Progress towards goals / Updated goals: Patient demonstrates good tolerance with exercises and is progressing towards goals.  Patient continues to have increased tissue turgor in R QL.                                PLAN  [x]  Upgrade activities as tolerated     [x]  Continue plan of care  [x]  Update interventions per flow sheet       []  Discharge due to:_  []  Other:_      Nacny Mac PTA, CPT 8/3/2017  3:13 PM

## 2017-08-07 ENCOUNTER — HOSPITAL ENCOUNTER (OUTPATIENT)
Dept: PHYSICAL THERAPY | Age: 27
Discharge: HOME | End: 2017-08-07
Payer: SELF-PAY

## 2017-08-07 PROCEDURE — 97110 THERAPEUTIC EXERCISES: CPT | Performed by: PHYSICAL MEDICINE & REHABILITATION

## 2017-08-07 NOTE — PROGRESS NOTES
PT DAILY TREATMENT NOTE 2-15    Patient Name: Jesus Barillas  Date:2017  : 1990  [x]  Patient  Verified  Payor: SELF PAY / Plan: [facility] SELF PAY / Product Type: Self Pay /    In time: 245 pm  Out time: 355 pm  Total Treatment Time (min): 70  Visit #: 11    Treatment Area: Low back ache [M54.5]    SUBJECTIVE  Pain Level (0-10 scale): 5/10  Any medication changes, allergies to medications, adverse drug reactions, diagnosis change, or new procedure performed?: [x] No    [] Yes (see summary sheet for update)  Subjective functional status/changes:   [] No changes reported  Patient reports that he feels a little better but still having that L sided back pain.     OBJECTIVE      Modality rationale: decrease inflammation, decrease pain and increase tissue extensibility to improve the patients ability to ADLs, standing, walking tolerance   Min Type Additional Details    [] Estim: []Att   []Unatt        []TENS instruct                  []IFC  []Premod   []NMES                     []Other:  []w/US   []w/ice   []w/heat  Position:   Location:       []  Traction: [] Cervical       []Lumbar                       [] Prone          []Supine                       []Intermittent   []Continuous Lbs:  [] before manual  [] after manual  []w/heat      []  Ultrasound: []Continuous   [] Pulsed at:                           []1MHz   []3MHz Location:  W/cm2:      [] Paraffin      Location:   []w/heat   10 []  Ice     [x]  Heat  []  Ice massage Position: supine   Location: Low back      []  Laser  []  Other: Position:  Location:      []  Vasopneumatic Device Pressure:       [] lo [] med [] hi   Temperature:        [x] Skin assessment post-treatment:  [x]intact []redness- no adverse reaction    []redness  adverse reaction:       55 min Therapeutic Exercise:  [x] See flow sheet :   Rationale: increase ROM, increase strength, improve coordination, improve balance and increase proprioception to improve the patients ability to ADLs, standing tolerance      With   [x] TE   [] TA   [] neuro   [] other: Patient Education: [x] Review HEP    [] Progressed/Changed HEP based on:   [] positioning   [] body mechanics   [] transfers   [] heat/ice application    [] other:        Other Objective/Functional Measures:    Pt continues to have significant tenderness to palpation to L QL. Pain Level (0-10 scale) post treatment: 5      ASSESSMENT/Changes in Function:     Patient will continue to benefit from skilled PT services to modify and progress therapeutic interventions, address functional mobility deficits, address ROM deficits, address strength deficits, analyze and address soft tissue restrictions, analyze and cue movement patterns, analyze and modify body mechanics/ergonomics and assess and modify postural abnormalities to attain remaining goals.      []  See Plan of Care  []  See progress note/recertification  []  See Discharge Summary      Progress towards goals / Updated goals:   Pt has made little progress towards goals as he continues to report 5-6/10 pain levels with minimal relief with manual intervention. Pt is unable to meet goals due to constant pain present. Pt do not have any increase in pain with standing therex but TTP remains the same. Will f/u with MD and continue PT until visit.  FOTO score improved 4 points.                 1) Pt will be able to Sit greater than  60 minutes without pain  2) Pt will be able to Stand greater than  60 minutes without increase of pain  3) Pt will be able to Ambulate greater than 3 blocks without increase of pain  4) Pt will be able to retrieve item form ground without pain  5) Pt will be able to carry >/= 40 lbs without pain                       PLAN  [x]  Upgrade activities as tolerated     [x]  Continue plan of care  [x]  Update interventions per flow sheet       []  Discharge due to:_  []  Other:_      Lorriane Hatchet, PTA, CPT 8/7/2017  3:13 PM

## 2017-08-08 NOTE — PROGRESS NOTES
[facility]  [place]  [facility]  Phone: [phone]      Fax:  [phone]    Progress Note    Name: Catie Leigh   : 1990   MD: Cullen Dias., MD       Treatment Diagnosis: Low back ache [M54.5]  Start of Care: 17   Visits from Start of Care: 11  Missed Visits: 1    Summary of Care:  Pt has made little progress towards goals as he continues to report 5-6/10 pain levels with minimal relief with manual intervention. Pt is unable to meet goals due to constant pain present. Pt do not have any increase in pain with standing therex but TTP remains the same. Will f/u with MD and continue PT until visit. FOTO score improved 4 points.        Assessment / Recommendations:     Goals: NOT MET         1) Pt will be able to Sit greater than  60 minutes without pain   2) Pt will be able to Stand greater than  60 minutes without increase of pain  3) Pt will be able to Ambulate greater than 3 blocks without increase of pain  4) Pt will be able to retrieve item form ground without pain  5) Pt will be able to carry >/= 40 lbs without pain.         Other: progress until MD appointment      Petty Dowell 2017 9:43 AM    ________________________________________________________________________  NOTE TO PHYSICIAN:  Please complete the following and fax to: [facility]: [phone]  . Retain this original for your records. If you are unable to process this request in 24 hours, please contact our office.        ____ I have read the above report and request that my patient continue therapy with the following changes/special instructions:  ____ I have read the above report and request that my patient be discharged from therapy    Physician's Signature:_________________ Date:___________Time:__________

## 2017-08-10 ENCOUNTER — HOSPITAL ENCOUNTER (OUTPATIENT)
Dept: PHYSICAL THERAPY | Age: 27
Discharge: HOME | End: 2017-08-10
Payer: SELF-PAY

## 2017-08-10 PROCEDURE — 97110 THERAPEUTIC EXERCISES: CPT | Performed by: PHYSICAL THERAPIST

## 2017-08-10 PROCEDURE — 97140 MANUAL THERAPY 1/> REGIONS: CPT | Performed by: PHYSICAL THERAPIST

## 2017-08-10 NOTE — PROGRESS NOTES
PT DAILY TREATMENT NOTE 2-15    Patient Name: Jessee Guan  Date:8/10/2017  : 1990  [x]  Patient  Verified  Payor: SELF PAY / Plan: [facility] SELF PAY / Product Type: Self Pay /    In time:2:00p  Out time:3:10p  Total Treatment Time (min): 70  Visit #: 12    Treatment Area: Low back ache [M54.5]    SUBJECTIVE  Pain Level (0-10 scale): 5/10  Any medication changes, allergies to medications, adverse drug reactions, diagnosis change, or new procedure performed?: [x] No    [] Yes (see summary sheet for update)  Subjective functional status/changes:   [] No changes reported  The pt reports that he is feeling ok today. Feels like he is doing better. Also reporting he has not worked in 2 weeks now and he has been focusing more on his stretches and exercises.      OBJECTIVE            Modality rationale: decrease inflammation, decrease pain and increase tissue extensibility to improve the patients ability to ADLs, standing, walking tolerance   Min Type Additional Details     [] Estim: []Att   []Unatt        []TENS instruct                  []IFC  []Premod   []NMES                     []Other:  []w/US   []w/ice   []w/heat  Position:   Location:       []  Traction: [] Cervical       []Lumbar                       [] Prone          []Supine                       []Intermittent   []Continuous Lbs:  [] before manual  [] after manual  []w/heat      []  Ultrasound: []Continuous   [] Pulsed at:                           []1MHz   []3MHz Location:  W/cm2:      [] Paraffin      Location:   []w/heat   10 [x]  Ice     []  Heat  []  Ice massage Position: prone  Location: Low back      []  Laser  []  Other: Position:  Location:      []  Vasopneumatic Device Pressure:       [] lo [] med [] hi   Temperature:        [x] Skin assessment post-treatment:  [x]intact []redness- no adverse reaction    []redness  adverse reaction:       45 min Therapeutic Exercise:  [x] See flow sheet :   Rationale: increase ROM, increase strength, improve coordination, improve balance and increase proprioception to improve the patients ability to ADLs, standing tolerance    10 min Manual Therapy:  STM to left lumbar paraspinals in prone. Rationale: decrease pain, increase ROM and increase tissue extensibility to improve the patients ability to ADLs, standing tolerance            With   [x] TE   [] TA   [] neuro   [] other: Patient Education: [x] Review HEP    [] Progressed/Changed HEP based on:   [] positioning   [] body mechanics   [] transfers   [] heat/ice application    [] other:        Other Objective/Functional Measures:    Pt continues to have significant tenderness to palpation to L QL and  Upper lumbar/lower thoracic paraspinals      Pain Level (0-10 scale) post treatment: 5      ASSESSMENT/Changes in Function:   Pt reporting he is doing better overall. Added Bosu instability to balance and to squats to progress core stabilization and LE strengthening. he reported increased pain in his back with these.      Patient will continue to benefit from skilled PT services to modify and progress therapeutic interventions, address functional mobility deficits, address ROM deficits, address strength deficits, analyze and address soft tissue restrictions, analyze and cue movement patterns, analyze and modify body mechanics/ergonomics and assess and modify postural abnormalities to attain remaining goals.      [x]  See Plan of Care  []  See progress note/recertification  []  See Discharge Summary      Progress towards goals / Updated goals:   Pt still having pain levels of 5/10.  Had increased soreness with exercises today adding bosu ball to squats and progressing balance work.                 1) Pt will be able to Sit greater than  60 minutes without pain  2) Pt will be able to Stand greater than  60 minutes without increase of pain  3) Pt will be able to Ambulate greater than 3 blocks without increase of pain  4) Pt will be able to retrieve item form ground without pain  5) Pt will be able to carry >/= 40 lbs without pain                        PLAN  [x]  Upgrade activities as tolerated     [x]  Continue plan of care  [x]  Update interventions per flow sheet       []  Discharge due to:_  []  Other:_        May Hutson 8/10/2017  2:14 PM

## 2017-08-14 ENCOUNTER — HOSPITAL ENCOUNTER (OUTPATIENT)
Dept: PHYSICAL THERAPY | Age: 27
Discharge: HOME | End: 2017-08-14
Payer: SELF-PAY

## 2017-08-14 PROCEDURE — 97110 THERAPEUTIC EXERCISES: CPT | Performed by: PHYSICAL MEDICINE & REHABILITATION

## 2017-08-14 PROCEDURE — 97140 MANUAL THERAPY 1/> REGIONS: CPT | Performed by: PHYSICAL MEDICINE & REHABILITATION

## 2017-08-14 NOTE — PROGRESS NOTES
PT DAILY TREATMENT NOTE 2-15    Patient Name: Lena Palmer  Date:2017  : 1990  [x]  Patient  Verified  Payor: SELF PAY / Plan: [facility] SELF PAY / Product Type: Self Pay /    In time:305 pm  Out time:405 pm  Total Treatment Time (min): 60  Visit #: 13    Treatment Area: Low back ache [M54.5]    SUBJECTIVE  Pain Level (0-10 scale): 7/10  Any medication changes, allergies to medications, adverse drug reactions, diagnosis change, or new procedure performed?: [x] No    [] Yes (see summary sheet for update)  Subjective functional status/changes:   [] No changes reported  Pt reported that he woke up being more tight and painful today. Pt stated that he was hurting more after last visit in the back but also the ankles and knees.     OBJECTIVE            Modality rationale: decrease inflammation, decrease pain and increase tissue extensibility to improve the patients ability to ADLs, standing, walking tolerance   Min Type Additional Details     [] Estim: []Att   []Unatt        []TENS instruct                  []IFC  []Premod   []NMES                     []Other:  []w/US   []w/ice   []w/heat  Position:   Location:       []  Traction: [] Cervical       []Lumbar                       [] Prone          []Supine                       []Intermittent   []Continuous Lbs:  [] before manual  [] after manual  []w/heat      []  Ultrasound: []Continuous   [] Pulsed at:                           []1MHz   []3MHz Location:  W/cm2:      [] Paraffin      Location:   []w/heat   15 []  Ice     [x]  Heat  []  Ice massage Position: R SL  Location: Low back      []  Laser  []  Other: Position:  Location:      []  Vasopneumatic Device Pressure:       [] lo [] med [] hi   Temperature:        [x] Skin assessment post-treatment:  [x]intact []redness- no adverse reaction    []redness  adverse reaction:       35 min Therapeutic Exercise:  [x] See flow sheet :   Rationale: increase ROM, increase strength, improve coordination, improve balance and increase proprioception to improve the patients ability to ADLs, standing tolerance    10 min Manual Therapy:  STM to left lumbar paraspinals in prone. Rationale: decrease pain, increase ROM and increase tissue extensibility to improve the patients ability to ADLs, standing tolerance            With   [x] TE   [] TA   [] neuro   [] other: Patient Education: [x] Review HEP    [] Progressed/Changed HEP based on:   [] positioning   [] body mechanics   [] transfers   [] heat/ice application    [] other:        Other Objective/Functional Measures:    No standing therex today due to increase in back pain present.     Pain Level (0-10 scale) post treatment: 5      ASSESSMENT/Changes in Function:    Patient will continue to benefit from skilled PT services to modify and progress therapeutic interventions, address functional mobility deficits, address ROM deficits, address strength deficits, analyze and address soft tissue restrictions, analyze and cue movement patterns, analyze and modify body mechanics/ergonomics and assess and modify postural abnormalities to attain remaining goals.      []  See Plan of Care  []  See progress note/recertification  []  See Discharge Summary      Progress towards goals / Updated goals:   Pt is making little to no progress.  Requested pt f/u with MD.                     PLAN  [x]  Upgrade activities as tolerated     [x]  Continue plan of care  [x]  Update interventions per flow sheet       []  Discharge due to:_  []  Other:_        Lambert Mcdonald PTA, CPT 8/14/2017  2:14 PM

## 2017-08-17 ENCOUNTER — HOSPITAL ENCOUNTER (OUTPATIENT)
Dept: PHYSICAL THERAPY | Age: 27
Discharge: HOME | End: 2017-08-17
Payer: SELF-PAY

## 2017-08-17 PROCEDURE — 97110 THERAPEUTIC EXERCISES: CPT | Performed by: PHYSICAL MEDICINE & REHABILITATION

## 2017-08-17 NOTE — PROGRESS NOTES
PT DAILY TREATMENT NOTE 2-15    Patient Name: Madeleine Abbott  Date:2017  : 1990  [x]  Patient  Verified  Payor: SELF PAY [facility] / Product Type: Self Pay /    In time:310 pm  Out time:405 pm  Total Treatment Time (min): 55  Visit #: 14    Treatment Area: Low back ache [M54.5]    SUBJECTIVE  Pain Level (0-10 scale): 4/10  Any medication changes, allergies to medications, adverse drug reactions, diagnosis change, or new procedure performed?: [x] No    [] Yes (see summary sheet for update)  Subjective functional status/changes:   [] No changes reported  Pt reported that he is feeling much better than last visit.     OBJECTIVE            Modality rationale: decrease inflammation, decrease pain and increase tissue extensibility to improve the patients ability to ADLs, standing, walking tolerance   Min Type Additional Details     [] Estim: []Att   []Unatt        []TENS instruct                  []IFC  []Premod   []NMES                     []Other:  []w/US   []w/ice   []w/heat  Position:   Location:       []  Traction: [] Cervical       []Lumbar                       [] Prone          []Supine                       []Intermittent   []Continuous Lbs:  [] before manual  [] after manual  []w/heat      []  Ultrasound: []Continuous   [] Pulsed at:                           []1MHz   []3MHz Location:  W/cm2:      [] Paraffin      Location:   []w/heat   10 []  Ice     [x]  Heat  []  Ice massage Position: R SL  Location: Low back      []  Laser  []  Other: Position:  Location:      []  Vasopneumatic Device Pressure:       [] lo [] med [] hi   Temperature:        [x] Skin assessment post-treatment:  [x]intact []redness- no adverse reaction    []redness  adverse reaction:       45 min Therapeutic Exercise:  [x] See flow sheet :   Rationale: increase ROM, increase strength, improve coordination, improve balance and increase proprioception to improve the patients ability to ADLs, standing tolerance      With   [x] TE   [] TA   [] neuro   [] other: Patient Education: [x] Review HEP    [] Progressed/Changed HEP based on:   [] positioning   [] body mechanics   [] transfers   [] heat/ice application    [] other:        Other Objective/Functional Measures:    No increase in pain with today's standing therex     Pain Level (0-10 scale) post treatment: 3      ASSESSMENT/Changes in Function:    Patient will continue to benefit from skilled PT services to modify and progress therapeutic interventions, address functional mobility deficits, address ROM deficits, address strength deficits, analyze and address soft tissue restrictions, analyze and cue movement patterns, analyze and modify body mechanics/ergonomics and assess and modify postural abnormalities to attain remaining goals.      []  See Plan of Care  []  See progress note/recertification  []  See Discharge Summary      Progress towards goals / Updated goals:   Pt is making little to no progress.  Requested pt f/u with MD.                     PLAN  [x]  Upgrade activities as tolerated     [x]  Continue plan of care  [x]  Update interventions per flow sheet       []  Discharge due to:_  []  Other:_        Ani Davis, JOSE, CPT 8/17/2017  2:14 PM

## 2017-08-21 ENCOUNTER — HOSPITAL ENCOUNTER (OUTPATIENT)
Dept: PHYSICAL THERAPY | Age: 27
Discharge: HOME | End: 2017-08-21
Payer: SELF-PAY

## 2017-08-21 PROCEDURE — 97110 THERAPEUTIC EXERCISES: CPT | Performed by: PHYSICAL MEDICINE & REHABILITATION

## 2017-08-21 NOTE — PROGRESS NOTES
PT DAILY TREATMENT NOTE 2-15    Patient Name: Sandy Benítez  Date:2017  : 1990  [x]  Patient  Verified  Payor: SELF PAY / Plan: [facility] SELF PAY / Product Type: Self Pay /    In time:300 pm  Out time:400 pm  Total Treatment Time (min): 60  Visit #: 15    Treatment Area: Low back ache [M54.5]    SUBJECTIVE  Pain Level (0-10 scale): 3-4/10  Any medication changes, allergies to medications, adverse drug reactions, diagnosis change, or new procedure performed?: [x] No    [] Yes (see summary sheet for update)  Subjective functional status/changes:   [] No changes reported  Pt reported that he continues to feel a little better.     OBJECTIVE            Modality rationale: decrease inflammation, decrease pain and increase tissue extensibility to improve the patients ability to ADLs, standing, walking tolerance   Min Type Additional Details     [] Estim: []Att   []Unatt        []TENS instruct                  []IFC  []Premod   []NMES                     []Other:  []w/US   []w/ice   []w/heat  Position:   Location:       []  Traction: [] Cervical       []Lumbar                       [] Prone          []Supine                       []Intermittent   []Continuous Lbs:  [] before manual  [] after manual  []w/heat      []  Ultrasound: []Continuous   [] Pulsed at:                           []1MHz   []3MHz Location:  W/cm2:      [] Paraffin      Location:   []w/heat   10 []  Ice     [x]  Heat  []  Ice massage Position: R SL  Location: Low back      []  Laser  []  Other: Position:  Location:      []  Vasopneumatic Device Pressure:       [] lo [] med [] hi   Temperature:        [x] Skin assessment post-treatment:  [x]intact []redness- no adverse reaction    []redness  adverse reaction:       50 min Therapeutic Exercise:  [x] See flow sheet :   Rationale: increase ROM, increase strength, improve coordination, improve balance and increase proprioception to improve the patients ability to ADLs, standing tolerance      With   [x] TE   [] TA   [] neuro   [] other: Patient Education: [x] Review HEP    [] Progressed/Changed HEP based on:   [] positioning   [] body mechanics   [] transfers   [] heat/ice application    [] other:        Other Objective/Functional Measures:    No increase in pain with today's standing therex     Pain Level (0-10 scale) post treatment: 3      ASSESSMENT/Changes in Function:      Patient will continue to benefit from skilled PT services to modify and progress therapeutic interventions, address functional mobility deficits, address ROM deficits, address strength deficits, analyze and address soft tissue restrictions, analyze and cue movement patterns, analyze and modify body mechanics/ergonomics and assess and modify postural abnormalities to attain remaining goals.      []  See Plan of Care  []  See progress note/recertification  []  See Discharge Summary      Progress towards goals / Updated goals:   Pt is making little progress.  Requested pt f/u with MD.                     PLAN  [x]  Upgrade activities as tolerated     [x]  Continue plan of care  [x]  Update interventions per flow sheet       []  Discharge due to:_  []  Other:_        Debo Gonzales PTA, CPT 8/21/2017  2:14 PM

## 2017-08-24 ENCOUNTER — APPOINTMENT (OUTPATIENT)
Dept: PHYSICAL THERAPY | Age: 27
End: 2017-08-24
Payer: SELF-PAY

## 2017-08-28 ENCOUNTER — APPOINTMENT (OUTPATIENT)
Dept: PHYSICAL THERAPY | Age: 27
End: 2017-08-28
Payer: SELF-PAY

## 2017-08-29 ENCOUNTER — APPOINTMENT (OUTPATIENT)
Dept: PHYSICAL THERAPY | Age: 27
End: 2017-08-29
Payer: SELF-PAY

## 2017-08-29 ENCOUNTER — HOSPITAL ENCOUNTER (OUTPATIENT)
Dept: PHYSICAL THERAPY | Age: 27
Discharge: HOME | End: 2017-08-29
Payer: SELF-PAY

## 2017-08-29 PROCEDURE — 97110 THERAPEUTIC EXERCISES: CPT | Performed by: PHYSICAL MEDICINE & REHABILITATION

## 2017-08-29 NOTE — PROGRESS NOTES
PT DAILY TREATMENT NOTE 2-15    Patient Name: Jesus Barillas  Date:2017  : 1990  [x]  Patient  Verified  Payor: SELF PAY / Plan: [facility] SELF PAY / Product Type: Self Pay /    In time:200 pm  Out time:310 pm  Total Treatment Time (min): 70  Visit #: 16    Treatment Area: Low back ache [M54.5]    SUBJECTIVE  Pain Level (0-10 scale): 2/10  Any medication changes, allergies to medications, adverse drug reactions, diagnosis change, or new procedure performed?: [x] No    [] Yes (see summary sheet for update)  Subjective functional status/changes:   [] No changes reported  Pt reported that he is starting to feel much better.     OBJECTIVE            Modality rationale: decrease inflammation, decrease pain and increase tissue extensibility to improve the patients ability to ADLs, standing, walking tolerance   Min Type Additional Details     [] Estim: []Att   []Unatt        []TENS instruct                  []IFC  []Premod   []NMES                     []Other:  []w/US   []w/ice   []w/heat  Position:   Location:       []  Traction: [] Cervical       []Lumbar                       [] Prone          []Supine                       []Intermittent   []Continuous Lbs:  [] before manual  [] after manual  []w/heat      []  Ultrasound: []Continuous   [] Pulsed at:                           []1MHz   []3MHz Location:  W/cm2:      [] Paraffin      Location:   []w/heat   10 []  Ice     [x]  Heat  []  Ice massage Position: R SL  Location: Low back      []  Laser  []  Other: Position:  Location:      []  Vasopneumatic Device Pressure:       [] lo [] med [] hi   Temperature:        [x] Skin assessment post-treatment:  [x]intact []redness- no adverse reaction    []redness  adverse reaction:       60 min Therapeutic Exercise:  [x] See flow sheet :   Rationale: increase ROM, increase strength, improve coordination, improve balance and increase proprioception to improve the patients ability to ADLs, standing tolerance      With   [x] TE   [] TA   [] neuro   [] other: Patient Education: [x] Review HEP    [] Progressed/Changed HEP based on:   [] positioning   [] body mechanics   [] transfers   [] heat/ice application    [] other:        Other Objective/Functional Measures:    No increase in pain with today's standing therex      Pain Level (0-10 scale) post treatment: 1      ASSESSMENT/Changes in Function:      Patient will continue to benefit from skilled PT services to modify and progress therapeutic interventions, address functional mobility deficits, address ROM deficits, address strength deficits, analyze and address soft tissue restrictions, analyze and cue movement patterns, analyze and modify body mechanics/ergonomics and assess and modify postural abnormalities to attain remaining goals.      []  See Plan of Care  []  See progress note/recertification  []  See Discharge Summary      Progress towards goals / Updated goals:   Pt is starting to make some progress towards goals with improved ADLs and work tolerance.                PLAN  [x]  Upgrade activities as tolerated     [x]  Continue plan of care  [x]  Update interventions per flow sheet       []  Discharge due to:_  []  Other:_        Vishal Carter PTA, CPT 8/29/2017  2:14 PM

## 2017-10-03 NOTE — PROGRESS NOTES
[address]  Phone: [phone]  Fax: [phone]    Discharge Summary  2-15    Patient name: Madeleine Abbott  : 1990  Provider#: [id number]  Referral source: Kellen Watson MD      Medical/Treatment Diagnosis: Low back ache [M54.5]     Prior Hospitalization: see medical history     Comorbidities: See Plan of Care  Prior Level of Function:See Plan of Care  Medications: Verified on Patient Summary List    Start of Care: 17        Visits from Start of Care: 16     Missed Visits: 0  Reporting Period : 17 to 17      ASSESSMENT/SUMMARY OF CARE: Patient did very well with PT with an achievement of all long term goals and full reduction in low back pain. He has not needed to return tot he clinic since his last visit and will be discharged. 1) Pt will be able to Sit greater than  60 minutes without pain Met  2) Pt will be able to Stand greater than  60 minutes without increase of pain Met  3) Pt will be able to Ambulate greater than 3 blocks without increase of pain Met  4) Pt will be able to retrieve item form ground without pain Met  5) Pt will be able to carry >/= 40 lbs without pain . Met. RECOMMENDATIONS:  [x]Discontinue therapy: [x]Patient has reached or is progressing toward set goals      []Patient is non-compliant or has abdicated      []Due to lack of appreciable progress towards set goals      []Other    Willie Wilner, PT , DPT, OCS, Cert.  DN   10/3/2017 1:44 PM

## 2019-11-06 ENCOUNTER — OFFICE VISIT (OUTPATIENT)
Dept: INTERNAL MEDICINE CLINIC | Age: 29
End: 2019-11-06

## 2019-11-06 VITALS
WEIGHT: 205.4 LBS | DIASTOLIC BLOOD PRESSURE: 88 MMHG | OXYGEN SATURATION: 97 % | HEIGHT: 72 IN | SYSTOLIC BLOOD PRESSURE: 140 MMHG | TEMPERATURE: 98.6 F | HEART RATE: 85 BPM | BODY MASS INDEX: 27.82 KG/M2

## 2019-11-06 DIAGNOSIS — S06.0X0A CONCUSSION WITHOUT LOSS OF CONSCIOUSNESS, INITIAL ENCOUNTER: ICD-10-CM

## 2019-11-06 DIAGNOSIS — S33.5XXA LUMBAR SPRAIN, INITIAL ENCOUNTER: ICD-10-CM

## 2019-11-06 DIAGNOSIS — S13.9XXA NECK SPRAIN, INITIAL ENCOUNTER: Primary | ICD-10-CM
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
## 2019-11-06 NOTE — PATIENT INSTRUCTIONS
[employer] Activation    Thank you for requesting access to Money Dashboard. Please follow the instructions below to securely access and download your online medical record. Money Dashboard allows you to send messages to your doctor, view your test results, renew your prescriptions, schedule appointments, and more. How Do I Sign Up? 1. In your internet browser, go to [email or web address]  2. Click on the First Time User? Click Here link in the Sign In box. You will be redirect to the New Member Sign Up page. 3. Enter your Money Dashboard Access Code exactly as it appears below. You will not need to use this code after youve completed the sign-up process. If you do not sign up before the expiration date, you must request a new code. Money Dashboard Access Code: Activation code not generated  Current Money Dashboard Status: Active (This is the date your Money Dashboard access code will )    4. Enter the last four digits of your Social Security Number (xxxx) and Date of Birth (mm/dd/yyyy) as indicated and click Submit. You will be taken to the next sign-up page. 5. Create a Money Dashboard ID. This will be your Money Dashboard login ID and cannot be changed, so think of one that is secure and easy to remember. 6. Create a Money Dashboard password. You can change your password at any time. 7. Enter your Password Reset Question and Answer. This can be used at a later time if you forget your password. 8. Enter your e-mail address. You will receive e-mail notification when new information is available in [address] 9. Click Sign Up. You can now view and download portions of your medical record. 10. Click the Download Summary menu link to download a portable copy of your medical information. Additional Information    If you have questions, please visit the Frequently Asked Questions section of the Money Dashboard website at [email or web address] Remember, Money Dashboard is NOT to be used for urgent needs. For medical emergencies, dial 911.
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
## 2019-11-21 ENCOUNTER — HOSPITAL ENCOUNTER (OUTPATIENT)
Dept: PHYSICAL THERAPY | Age: 29
Discharge: HOME | End: 2019-11-21
Payer: SELF-PAY

## 2019-11-21 PROCEDURE — 97161 PT EVAL LOW COMPLEX 20 MIN: CPT | Performed by: PHYSICAL THERAPIST

## 2019-11-21 PROCEDURE — 97014 ELECTRIC STIMULATION THERAPY: CPT | Performed by: PHYSICAL THERAPIST

## 2019-11-21 PROCEDURE — 97110 THERAPEUTIC EXERCISES: CPT | Performed by: PHYSICAL THERAPIST

## 2019-11-21 PROCEDURE — 97140 MANUAL THERAPY 1/> REGIONS: CPT | Performed by: PHYSICAL THERAPIST
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
## 2019-11-25 ENCOUNTER — HOSPITAL ENCOUNTER (OUTPATIENT)
Dept: PHYSICAL THERAPY | Age: 29
Discharge: HOME | End: 2019-11-25
Payer: SELF-PAY

## 2019-11-25 PROCEDURE — 97140 MANUAL THERAPY 1/> REGIONS: CPT | Performed by: PHYSICAL THERAPIST

## 2019-11-25 PROCEDURE — 97014 ELECTRIC STIMULATION THERAPY: CPT | Performed by: PHYSICAL THERAPIST

## 2019-11-25 PROCEDURE — 97110 THERAPEUTIC EXERCISES: CPT | Performed by: PHYSICAL THERAPIST

## 2019-11-25 NOTE — PROGRESS NOTES
PT DAILY TREATMENT NOTE 2-15    Patient Name: Evlira Maradiaga  Date:2019  : 1990  [x]  Patient  Verified  Payor: SELF PAY / Plan: [facility] SELF PAY / Product Type: Self Pay /    In time:8:00 AM  Out time:9:00 AM  Total Treatment Time (min): 60  Visit #:  2    Treatment Area: Neck pain [M54.2]  Low back pain [M54.5]    SUBJECTIVE  Pain Level (0-10 scale): 3/10  Any medication changes, allergies to medications, adverse drug reactions, diagnosis change, or new procedure performed?: [x] No    [] Yes (see summary sheet for update)  Subjective functional status/changes:   [] No changes reported  Patient reports significant soreness after his last visit. Today his symptoms are about the same. OBJECTIVE         Modality rationale: decrease pain and increase tissue extensibility to improve the patients ability to sit without pain    Min Type Additional Details   15 [x]? Estim: []? Att   [x]? Unatt        []? TENS instruct                  []?IFC  [x]? Premod   []? NMES                     []?Other:  []?w/US   []?w/ice   [x]?w/heat  Position: L thoracic paraspinals, R lumbar paraspinals  Location:supine     []? Traction: []? Cervical       []? Lumbar                       []? Prone          []? Supine                       []?Intermittent   []? Continuous Lbs:  []? before manual  []? after manual  []?w/heat     []? Ultrasound: []? Continuous   []? Pulsed at:                            []? 1MHz   []? 3MHz Location:  W/cm2:     []? Paraffin         Location:  []?w/heat     []? Ice     []? Heat  []? Ice massage Position:  Location:     []? Laser  []? Other: Position:  Location:     []? Vasopneumatic Device Pressure:       []? lo []? med []? hi   Temperature:    [x]? Skin assessment post-treatment:  [x]? intact []? redness- no adverse reaction    []? redness  adverse reaction:      25 min Therapeutic Exercise:  [x]?  See flow sheet :   Rationale: increase ROM, increase strength and improve coordination to improve the patients ability to look up without pain     30 min Manual Therapy:    Suboccipital release  Grade III P/A mobilizations along the lower cervical spine in supine  Infraclavicular pumping  L pec/trunk rotation   Rationale: decrease pain, increase ROM and increase tissue extensibility  to improve the patients ability to sit without pain            With   [] TE   [] TA   [] neuro   [] other: Patient Education: [x] Review HEP    [] Progressed/Changed HEP based on:   [] positioning   [] body mechanics   [] transfers   [] heat/ice application    [] other:      Other Objective/Functional Measures:      Pain Level (0-10 scale) post treatment: 0    ASSESSMENT/Changes in Function:   Patient is tolerating all interventions well, but no change in function yet. Patient will continue to benefit from skilled PT services to modify and progress therapeutic interventions, address functional mobility deficits, address ROM deficits, address strength deficits, analyze and address soft tissue restrictions, analyze and cue movement patterns, analyze and modify body mechanics/ergonomics and assess and modify postural abnormalities to attain remaining goals. []  See Plan of Care  []  See progress note/recertification  []  See Discharge Summary         Progress towards goals / Updated goals:  No significant progress towards goals yet. PLAN  [x]  Upgrade activities as tolerated     [x]  Continue plan of care  []  Update interventions per flow sheet       []  Discharge due to:_  []  Other:_      Etienne Bernal, PT , DPT, OCS, Cert.  DN   11/25/2019

## 2019-12-03 ENCOUNTER — HOSPITAL ENCOUNTER (OUTPATIENT)
Dept: PHYSICAL THERAPY | Age: 29
Discharge: HOME | End: 2019-12-03
Payer: SELF-PAY

## 2019-12-03 PROCEDURE — 97110 THERAPEUTIC EXERCISES: CPT

## 2019-12-03 PROCEDURE — 97140 MANUAL THERAPY 1/> REGIONS: CPT

## 2019-12-03 PROCEDURE — 97014 ELECTRIC STIMULATION THERAPY: CPT

## 2019-12-03 NOTE — PROGRESS NOTES
PT DAILY TREATMENT NOTE 2-15    Patient Name: Clayton Lozada  Date:12/3/2019  : 1990  [x]  Patient  Verified  Payor: SELF PAY / Plan: [facility] SELF PAY / Product Type: Self Pay /    In time:8:00 AM  Out time:9:10 AM  Total Treatment Time (min): 70  Visit #:  3    Treatment Area: Neck pain [M54.2]  Low back pain [M54.5]    SUBJECTIVE  Pain Level (0-10 scale): 3/10  Any medication changes, allergies to medications, adverse drug reactions, diagnosis change, or new procedure performed?: [x] No    [] Yes (see summary sheet for update)  Subjective functional status/changes:   [] No changes reported  Patient reports no changes. Patient reports he was very sore after last visit. Patient states his neck very stiff this morning and has been getting some pain with some of the stretches. OBJECTIVE      Modality rationale: decrease pain and increase tissue extensibility to improve the patients ability to sit without pain    Min Type Additional Details   15 [x]? Estim: []? Att   [x]? Unatt        []? TENS instruct                  []?IFC  [x]? Premod   []? NMES                     []?Other:  []?w/US   []?w/ice   [x]?w/heat  Position: L thoracic paraspinals, L lumbar paraspinals  Location:supine     []? Traction: []? Cervical       []? Lumbar                       []? Prone          []? Supine                       []?Intermittent   []? Continuous Lbs:  []? before manual  []? after manual  []?w/heat     []? Ultrasound: []? Continuous   []? Pulsed at:                            []? 1MHz   []? 3MHz Location:  W/cm2:     []? Paraffin         Location:  []?w/heat     []? Ice     []? Heat  []? Ice massage Position:  Location:     []? Laser  []? Other: Position:  Location:     []? Vasopneumatic Device Pressure:       []? lo []? med []? hi   Temperature:    [x]? Skin assessment post-treatment:  [x]? intact []? redness- no adverse reaction    []? redness  adverse reaction:      30 min Therapeutic Exercise:  [x]?  See flow sheet : Rationale: increase ROM, increase strength and improve coordination to improve the patients ability to look up without pain     25 min Manual Therapy:  MFR L UT, cervical paraspinals, scalenes, SCM, SOR with manual traction, Mobs grade II-III c-spine   Rationale: decrease pain, increase ROM and increase tissue extensibility  to improve the patients ability to sit without pain            With   [] TE   [] TA   [] neuro   [] other: Patient Education: [x] Review HEP    [] Progressed/Changed HEP based on:   [] positioning   [] body mechanics   [] transfers   [] heat/ice application    [] other:      Other Objective/Functional Measures:      Pain Level (0-10 scale) post treatment: 0    ASSESSMENT/Changes in Function:   Patient is tolerating all interventions well, but no change in function yet. Patient will continue to benefit from skilled PT services to modify and progress therapeutic interventions, address functional mobility deficits, address ROM deficits, address strength deficits, analyze and address soft tissue restrictions, analyze and cue movement patterns, analyze and modify body mechanics/ergonomics and assess and modify postural abnormalities to attain remaining goals. []  See Plan of Care  []  See progress note/recertification  []  See Discharge Summary         Progress towards goals / Updated goals:  Patient continues to have increased tightness and pain with little relief since initial eval. Patient will do well with continued slow progression to improve ROM and decrease pain.      PLAN  [x]  Upgrade activities as tolerated     [x]  Continue plan of care  []  Update interventions per flow sheet       []  Discharge due to:_  []  Other:_      Cade Daley PTA  12/3/2019

## 2019-12-05 ENCOUNTER — HOSPITAL ENCOUNTER (OUTPATIENT)
Dept: PHYSICAL THERAPY | Age: 29
Discharge: HOME | End: 2019-12-05
Payer: SELF-PAY

## 2019-12-05 PROCEDURE — 97014 ELECTRIC STIMULATION THERAPY: CPT

## 2019-12-05 PROCEDURE — 97110 THERAPEUTIC EXERCISES: CPT

## 2019-12-05 PROCEDURE — 97140 MANUAL THERAPY 1/> REGIONS: CPT

## 2019-12-05 NOTE — PROGRESS NOTES
PT DAILY TREATMENT NOTE 2-15    Patient Name: Armando Barr  Date:2019  : 1990  [x]  Patient  Verified  Payor: SELF PAY / Plan: [facility] SELF PAY / Product Type: Self Pay /    In time:8:00 AM  Out time:9:15 AM  Total Treatment Time (min): 70  Visit #:  4    Treatment Area: Neck pain [M54.2]  Low back pain [M54.5]    SUBJECTIVE  Pain Level (0-10 scale): 2/10-pain, 6 with tightness/movment  Any medication changes, allergies to medications, adverse drug reactions, diagnosis change, or new procedure performed?: [x] No    [] Yes (see summary sheet for update)  Subjective functional status/changes:   [] No changes reported  Patient reports he feels a little better but continues to have increased tightness through left side of neck and increased pain with some movements. Patient reports no issues after last visit. OBJECTIVE      Modality rationale: decrease pain and increase tissue extensibility to improve the patients ability to sit without pain    Min Type Additional Details   15 [x]? Estim: []? Att   [x]? Unatt        []? TENS instruct                  []?IFC  [x]? Premod   []? NMES                     []?Other:  []?w/US   []?w/ice   [x]?w/heat  Position: L thoracic paraspinals, L lumbar paraspinals  Location:supine     []? Traction: []? Cervical       []? Lumbar                       []? Prone          []? Supine                       []?Intermittent   []? Continuous Lbs:  []? before manual  []? after manual  []?w/heat     []? Ultrasound: []? Continuous   []? Pulsed at:                            []? 1MHz   []? 3MHz Location:  W/cm2:     []? Paraffin         Location:  []?w/heat     []? Ice     []? Heat  []? Ice massage Position:  Location:     []? Laser  []? Other: Position:  Location:     []? Vasopneumatic Device Pressure:       []? lo []? med []? hi   Temperature:    [x]? Skin assessment post-treatment:  [x]? intact []? redness- no adverse reaction    []? redness  adverse reaction:      45 min Therapeutic Exercise:  [x]? See flow sheet :   Rationale: increase ROM, increase strength and improve coordination to improve the patients ability to look up without pain     15 min Manual Therapy:  MFR L UT, cervical paraspinals, scalenes, SCM, SOR with manual traction, Mobs grade II-III c-spine   Rationale: decrease pain, increase ROM and increase tissue extensibility  to improve the patients ability to sit without pain            With   [] TE   [] TA   [] neuro   [] other: Patient Education: [x] Review HEP    [] Progressed/Changed HEP based on:   [] positioning   [] body mechanics   [] transfers   [] heat/ice application    [] other:      Other Objective/Functional Measures:      Pain Level (0-10 scale) post treatment: 0    ASSESSMENT/Changes in Function:   Patient able to advance several exercises with min increased tightness. Patient continues to demonstrates slow progress towards goals. Patient will continue to benefit from skilled PT services to modify and progress therapeutic interventions, address functional mobility deficits, address ROM deficits, address strength deficits, analyze and address soft tissue restrictions, analyze and cue movement patterns, analyze and modify body mechanics/ergonomics and assess and modify postural abnormalities to attain remaining goals. []  See Plan of Care  []  See progress note/recertification  []  See Discharge Summary         Progress towards goals / Updated goals:  Patient continues to have increased tightness and pain and was able to advance several exercises with no increased pain, however did have increased tightness reported. Patient will do well with continued slow progression to improve ROM and decrease pain.      PLAN  [x]  Upgrade activities as tolerated     [x]  Continue plan of care  []  Update interventions per flow sheet       []  Discharge due to:_  []  Other:_      Carolina Gordillo PTA  12/5/2019

## 2019-12-09 ENCOUNTER — APPOINTMENT (OUTPATIENT)
Dept: PHYSICAL THERAPY | Age: 29
End: 2019-12-09
Payer: SELF-PAY

## 2019-12-10 ENCOUNTER — OFFICE VISIT (OUTPATIENT)
Dept: INTERNAL MEDICINE CLINIC | Age: 29
End: 2019-12-10

## 2019-12-10 VITALS
RESPIRATION RATE: 19 BRPM | DIASTOLIC BLOOD PRESSURE: 70 MMHG | HEIGHT: 72 IN | BODY MASS INDEX: 27.77 KG/M2 | WEIGHT: 205 LBS | TEMPERATURE: 98.6 F | HEART RATE: 76 BPM | OXYGEN SATURATION: 98 % | SYSTOLIC BLOOD PRESSURE: 140 MMHG

## 2019-12-10 DIAGNOSIS — S33.5XXD LUMBAR SPRAIN, SUBSEQUENT ENCOUNTER: ICD-10-CM

## 2019-12-10 DIAGNOSIS — S13.9XXD NECK SPRAIN, SUBSEQUENT ENCOUNTER: Primary | ICD-10-CM

## 2019-12-10 RX ORDER — CEPHALEXIN 500 MG/1
500 CAPSULE ORAL 4 TIMES DAILY
Qty: 28 CAP | Refills: 0 | Status: SHIPPED | OUTPATIENT
Start: 2019-12-10

## 2019-12-10 RX ORDER — NAPROXEN 500 MG/1
500 TABLET ORAL 2 TIMES DAILY WITH MEALS
Qty: 60 TAB | Refills: 3 | Status: SHIPPED | OUTPATIENT
Start: 2019-12-10

## 2019-12-10 RX ORDER — CYCLOBENZAPRINE HCL 10 MG
10 TABLET ORAL 2 TIMES DAILY
Qty: 60 TAB | Refills: 3 | Status: SHIPPED | OUTPATIENT
Start: 2019-12-10
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
## 2019-12-10 NOTE — PATIENT INSTRUCTIONS
[email or web address] Activation Thank you for requesting access to [employer]. Please follow the instructions below to securely access and download your online medical record. [employer] allows you to send messages to your doctor, view your test results, renew your prescriptions, schedule appointments, and more. How Do I Sign Up? 1. In your internet browser, go to [email or web address] 
2. Click on the First Time User? Click Here link in the Sign In box. You will be redirect to the New Member Sign Up page. 3. Enter your [employer] Access Code exactly as it appears below. You will not need to use this code after youve completed the sign-up process. If you do not sign up before the expiration date, you must request a new code. [employer] Access Code: Activation code not generated Current [employer] [facility] Status: Active (This is the date your [employer] access code will ) 4. Enter the last four digits of your Social Security Number (xxxx) and Date of Birth (mm/dd/yyyy) as indicated and click Submit. You will be taken to the next sign-up page. 5. Create a [employer] ID. This will be your [employer] login ID and cannot be changed, so think of one that is secure and easy to remember. 6. Create a [employer] password. You can change your password at any time. 7. Enter your Password Reset Question and Answer. This can be used at a later time if you forget your password. 8. Enter your e-mail address. You will receive e-mail notification when new information is available in [address] 9. Click Sign Up. You can now view and download portions of your medical record. 10. Click the Download Summary menu link to download a portable copy of your medical information. Additional Information If you have questions, please visit the Frequently Asked Questions section of the [employer] website at [email or web address] Remember, [employer] [facility] is NOT to be used for urgent needs. For medical emergencies, dial 911.

## 2019-12-11 ENCOUNTER — HOSPITAL ENCOUNTER (OUTPATIENT)
Dept: PHYSICAL THERAPY | Age: 29
Discharge: HOME | End: 2019-12-11
Payer: SELF-PAY

## 2019-12-11 PROCEDURE — 97110 THERAPEUTIC EXERCISES: CPT | Performed by: PHYSICAL THERAPIST

## 2019-12-11 PROCEDURE — 97140 MANUAL THERAPY 1/> REGIONS: CPT | Performed by: PHYSICAL THERAPIST

## 2019-12-11 NOTE — PROGRESS NOTES
PT DAILY TREATMENT NOTE 2-15    Patient Name: Jen Carlin  Date:2019  : 1990  [x]  Patient  Verified  Payor: SELF PAY / Plan: [facility] SELF PAY / Product Type: Self Pay /    In time: 7:30 AM  Out time:8:30 AM  Total Treatment Time (min): 60  Visit #:  5    Treatment Area: Neck pain [M54.2]  Low back pain [M54.5]    SUBJECTIVE  Pain Level (0-10 scale): 2/10-pain, 6 with tightness/movment  Any medication changes, allergies to medications, adverse drug reactions, diagnosis change, or new procedure performed?: [x] No    [] Yes (see summary sheet for update)  Subjective functional status/changes:   [] No changes reported  Patient reports that he has a cyst along his tailbone that is causing severe pain while sitting. He went to his PCP on Monday and was prescribed antibiotics. His neck and low back are about the same, but he wants to avoid activities that place pressure on the tailbone. OBJECTIVE      Modality rationale: decrease pain and increase tissue extensibility to improve the patients ability to sit without pain    Min Type Additional Details   15 [x]? Estim: []? Att   [x]? Unatt        []? TENS instruct                  []?IFC  [x]? Premod   []? NMES                     []?Other:  []?w/US   []?w/ice   [x]?w/heat  Position: L thoracic paraspinals, L lumbar paraspinals  Location:supine     []? Traction: []? Cervical       []? Lumbar                       []? Prone          []? Supine                       []?Intermittent   []? Continuous Lbs:  []? before manual  []? after manual  []?w/heat     []? Ultrasound: []? Continuous   []? Pulsed at:                            []? 1MHz   []? 3MHz Location:  W/cm2:     []? Paraffin         Location:  []?w/heat     []? Ice     []? Heat  []? Ice massage Position:  Location:     []? Laser  []? Other: Position:  Location:     []? Vasopneumatic Device Pressure:       []? lo []? med []? hi   Temperature:    [x]? Skin assessment post-treatment:  [x]? intact []?redness- no adverse reaction    []? redness  adverse reaction:      15 min Therapeutic Exercise:  [x]? See flow sheet :   Rationale: increase ROM, increase strength and improve coordination to improve the patients ability to look up without pain     30 min Manual Therapy: In prone:   Grade III P/A mobilizations from T1-L3    In supine (90/90 with wedge placed under pelvis): Infraclavicular pumping  Passive stretching for B upper trapezius  Grade III P/A mobilizations along the lower cervical spine   Rationale: decrease pain, increase ROM and increase tissue extensibility  to improve the patients ability to sit without pain            With   [] TE   [] TA   [] neuro   [] other: Patient Education: [x] Review HEP    [] Progressed/Changed HEP based on:   [] positioning   [] body mechanics   [] transfers   [] heat/ice application    [] other:      Other Objective/Functional Measures:    Able to tolerate all interventions today despite cyst pain    Pain Level (0-10 scale) post treatment: 0    ASSESSMENT/Changes in Function:   No significant change in functional status compared to last week. Patient will continue to benefit from skilled PT services to modify and progress therapeutic interventions, address functional mobility deficits, address ROM deficits, address strength deficits, analyze and address soft tissue restrictions, analyze and cue movement patterns, analyze and modify body mechanics/ergonomics and assess and modify postural abnormalities to attain remaining goals. []  See Plan of Care  []  See progress note/recertification  []  See Discharge Summary         Progress towards goals / Updated goals:  Slow overall progress towards goals and further progress may be hindered by new onset of cyst along coccyx.      PLAN  [x]  Upgrade activities as tolerated     [x]  Continue plan of care  []  Update interventions per flow sheet       []  Discharge due to:_  []  Other:_      Jon Hidalgo, PT  , DPT, OCS, Cert. DN    [id number]/10/[id number]

## 2019-12-13 ENCOUNTER — HOSPITAL ENCOUNTER (OUTPATIENT)
Dept: PHYSICAL THERAPY | Age: 29
Discharge: HOME | End: 2019-12-13
Payer: SELF-PAY

## 2019-12-13 PROCEDURE — 97110 THERAPEUTIC EXERCISES: CPT | Performed by: PHYSICAL THERAPIST

## 2019-12-13 PROCEDURE — 97140 MANUAL THERAPY 1/> REGIONS: CPT | Performed by: PHYSICAL THERAPIST

## 2019-12-13 PROCEDURE — 97014 ELECTRIC STIMULATION THERAPY: CPT | Performed by: PHYSICAL THERAPIST

## 2019-12-13 NOTE — PROGRESS NOTES
PT DAILY TREATMENT NOTE 2-15    Patient Name: Jen Carlin  Date:2019  : 1990  [x]  Patient  Verified  Payor: SELF PAY / Plan: [facility] SELF PAY / Product Type: Self Pay /    In time: 7:30 AM  Out time:8:30 AM  Total Treatment Time (min): 60  Visit #:  6    Treatment Area: Neck pain [M54.2]  Low back pain [M54.5]    SUBJECTIVE  Pain Level (0-10 scale): 2/10-pain, 6 with tightness/movment  Any medication changes, allergies to medications, adverse drug reactions, diagnosis change, or new procedure performed?: [x] No    [] Yes (see summary sheet for update)  Subjective functional status/changes:   [x] No changes reported    OBJECTIVE      Modality rationale: decrease pain and increase tissue extensibility to improve the patients ability to sit without pain    Min Type Additional Details   15 [x]? Estim: []? Att   [x]? Unatt        []? TENS instruct                  []?IFC  [x]? Premod   []? NMES                     []?Other:  []?w/US   []?w/ice   [x]?w/heat  Position: L thoracic paraspinals, L lumbar paraspinals  Location:supine     []? Traction: []? Cervical       []? Lumbar                       []? Prone          []? Supine                       []?Intermittent   []? Continuous Lbs:  []? before manual  []? after manual  []?w/heat     []? Ultrasound: []? Continuous   []? Pulsed at:                            []? 1MHz   []? 3MHz Location:  W/cm2:     []? Paraffin         Location:  []?w/heat     []? Ice     []? Heat  []? Ice massage Position:  Location:     []? Laser  []? Other: Position:  Location:     []? Vasopneumatic Device Pressure:       []? lo []? med []? hi   Temperature:    [x]? Skin assessment post-treatment:  [x]? intact []? redness- no adverse reaction    []? redness  adverse reaction:      15 min Therapeutic Exercise:  [x]?  See flow sheet :   Rationale: increase ROM, increase strength and improve coordination to improve the patients ability to look up without pain     30 min Manual Therapy: In prone:   Grade III P/A mobilizations from T1-L3    In supine (90/90 with wedge placed under pelvis): Infraclavicular pumping  Passive stretching for B upper trapezius  Grade III P/A mobilizations along the lower cervical spine   Rationale: decrease pain, increase ROM and increase tissue extensibility  to improve the patients ability to sit without pain            With   [] TE   [] TA   [] neuro   [] other: Patient Education: [x] Review HEP    [] Progressed/Changed HEP based on:   [] positioning   [] body mechanics   [] transfers   [] heat/ice application    [] other:      Other Objective/Functional Measures:    No modifications in exercises due to continued cyst pain    Pain Level (0-10 scale) post treatment: 0    ASSESSMENT/Changes in Function:   Continued difficulty progressing with therapy due to recent cyst pain. Patient will continue to benefit from skilled PT services to modify and progress therapeutic interventions, address functional mobility deficits, address ROM deficits, address strength deficits, analyze and address soft tissue restrictions, analyze and cue movement patterns, analyze and modify body mechanics/ergonomics and assess and modify postural abnormalities to attain remaining goals. []  See Plan of Care  []  See progress note/recertification  []  See Discharge Summary         Progress towards goals / Updated goals:  No significant progress towards goals over the past week. PLAN  [x]  Upgrade activities as tolerated     [x]  Continue plan of care  []  Update interventions per flow sheet       []  Discharge due to:_  []  Other:_      Etienne Bernal, PT  , DPT, OCS, Cert.  DN    12/13/2019

## 2019-12-16 ENCOUNTER — APPOINTMENT (OUTPATIENT)
Dept: PHYSICAL THERAPY | Age: 29
End: 2019-12-16
Payer: SELF-PAY

## 2019-12-18 ENCOUNTER — HOSPITAL ENCOUNTER (OUTPATIENT)
Dept: PHYSICAL THERAPY | Age: 29
Discharge: HOME | End: 2019-12-18
Payer: SELF-PAY

## 2019-12-18 PROCEDURE — 97014 ELECTRIC STIMULATION THERAPY: CPT | Performed by: PHYSICAL THERAPIST

## 2019-12-18 PROCEDURE — 97112 NEUROMUSCULAR REEDUCATION: CPT | Performed by: PHYSICAL THERAPIST

## 2019-12-18 NOTE — PROGRESS NOTES
PT DAILY TREATMENT NOTE 2-15    Patient Name: Isai White  Date:2019  : 1990  [x]  Patient  Verified  Payor: SELF PAY / Plan: [facility] SELF PAY / Product Type: Self Pay /    In time: 8:50 AM  Out time:9:50 AM  Total Treatment Time (min): 60  Visit #:  7    Treatment Area: Neck pain [M54.2]  Low back pain [M54.5]    SUBJECTIVE  Pain Level (0-10 scale): 2/10-pain, 4 with tightness/movment  Any medication changes, allergies to medications, adverse drug reactions, diagnosis change, or new procedure performed?: [x] No    [] Yes (see summary sheet for update)  Subjective functional status/changes:   [] No changes reported  Patient reports that his upper back is very tight and he's having a lot of soreness along the L scapula. The cyst pain has improved significantly. OBJECTIVE      Modality rationale: decrease pain and increase tissue extensibility to improve the patients ability to sit without pain    Min Type Additional Details   15 [x]? Estim: []? Att   [x]? Unatt        []? TENS instruct                  []?IFC  [x]? Premod   []? NMES                     []?Other:  []?w/US   []?w/ice   [x]?w/heat  Position: L thoracic paraspinals, L lumbar paraspinals  Location:supine     []? Traction: []? Cervical       []? Lumbar                       []? Prone          []? Supine                       []?Intermittent   []? Continuous Lbs:  []? before manual  []? after manual  []?w/heat     []? Ultrasound: []? Continuous   []? Pulsed at:                            []? 1MHz   []? 3MHz Location:  W/cm2:     []? Paraffin         Location:  []?w/heat     []? Ice     []? Heat  []? Ice massage Position:  Location:     []? Laser  []? Other: Position:  Location:     []? Vasopneumatic Device Pressure:       []? lo []? med []? hi   Temperature:    [x]? Skin assessment post-treatment:  [x]? intact []? redness- no adverse reaction    []? redness  adverse reaction:      25 min Neuromuscular Re-education:  [x]?  See flow sheet : Rationale: increase ROM, increase strength and improve coordination to improve the patients ability to look up without pain     20 min Manual Therapy: In supine (90/90 with wedge placed under pelvis):  L AIC  Apical expansion  Infraclavicular pumping  B Sibson's  L pec release  R subclavius release   Rationale: decrease pain, increase ROM and increase tissue extensibility  to improve the patients ability to sit without pain            With   [] TE   [] TA   [] neuro   [] other: Patient Education: [x] Review HEP    [] Progressed/Changed HEP based on:   [] positioning   [] body mechanics   [] transfers   [] heat/ice application    [] other:      Other Objective/Functional Measures:    L horizontal abduction:   Initial: 0 degrees   Post: 30 degrees    Pain Level (0-10 scale) post treatment: 0    ASSESSMENT/Changes in Function:   Improved tolerance to TUCKER manual techniques compared to last visit. Patient will continue to benefit from skilled PT services to modify and progress therapeutic interventions, address functional mobility deficits, address ROM deficits, address strength deficits, analyze and address soft tissue restrictions, analyze and cue movement patterns, analyze and modify body mechanics/ergonomics and assess and modify postural abnormalities to attain remaining goals. []  See Plan of Care  []  See progress note/recertification  []  See Discharge Summary         Progress towards goals / Updated goals:  Patient tolerated today's therapy well and will continue to progress down brachial chain inhibition and left lower trap/serratus/right triceps facilitation. PLAN  [x]  Upgrade activities as tolerated     [x]  Continue plan of care  []  Update interventions per flow sheet       []  Discharge due to:_  []  Other:_      Leticia Costa, PT  , DPT, OCS, Cert.  DN    12/18/2019

## 2019-12-20 ENCOUNTER — APPOINTMENT (OUTPATIENT)
Dept: PHYSICAL THERAPY | Age: 29
End: 2019-12-20
Payer: SELF-PAY

## 2019-12-23 ENCOUNTER — HOSPITAL ENCOUNTER (OUTPATIENT)
Dept: PHYSICAL THERAPY | Age: 29
Discharge: HOME | End: 2019-12-23
Payer: SELF-PAY

## 2019-12-23 PROCEDURE — 97110 THERAPEUTIC EXERCISES: CPT

## 2019-12-23 PROCEDURE — 97140 MANUAL THERAPY 1/> REGIONS: CPT

## 2019-12-23 NOTE — PROGRESS NOTES
PT DAILY TREATMENT NOTE 2-15    Patient Name: Mealnie Braxton  Date:2019  : 1990  [x]  Patient  Verified  Payor: SELF PAY [facility] Product Type: [facility] /    In time: 8:10a  Out time:9:55a  Total Treatment Time (min): 45  Visit #:  8    Treatment Area: Neck pain [M54.2]  Low back pain [M54.5]    SUBJECTIVE  Pain Level (0-10 scale): 2/10-pain, 4 with tightness/movment  Any medication changes, allergies to medications, adverse drug reactions, diagnosis change, or new procedure performed?: [x] No    [] Yes (see summary sheet for update)  Subjective functional status/changes:   [] No changes reported  Patient reports he had a couple of episodes of sharp pain along the right upper shoulder blade. OBJECTIVE      Modality rationale: decrease pain and increase tissue extensibility to improve the patients ability to sit without pain    Min Type Additional Details   NT [x]? Estim: []? Att   [x]? Unatt        []? TENS instruct                  []?IFC  [x]? Premod   []? NMES                     []?Other:  []?w/US   []?w/ice   [x]?w/heat  Position: L thoracic paraspinals, L lumbar paraspinals  Location:supine     []? Traction: []? Cervical       []? Lumbar                       []? Prone          []? Supine                       []?Intermittent   []? Continuous Lbs:  []? before manual  []? after manual  []?w/heat     []? Ultrasound: []? Continuous   []? Pulsed at:                            []? 1MHz   []? 3MHz Location:  W/cm2:     []? Paraffin         Location:  []?w/heat     []? Ice     []? Heat  []? Ice massage Position:  Location:     []? Laser  []? Other: Position:  Location:     []? Vasopneumatic Device Pressure:       []? lo []? med []? hi   Temperature:    [x]? Skin assessment post-treatment:  [x]? intact []? redness- no adverse reaction    []? redness  adverse reaction:      25 min Neuromuscular Re-education:  [x]?  See flow sheet :   Rationale: increase ROM, increase strength and improve coordination to improve the patients ability to look up without pain     20 min Manual Therapy: In supine (90/90 with wedge placed under pelvis):  L AIC  Infraclavicular pumping  B Sibson's  L pec release  R subclavius release   Rationale: decrease pain, increase ROM and increase tissue extensibility  to improve the patients ability to sit without pain            With   [] TE   [] TA   [] neuro   [] other: Patient Education: [x] Review HEP    [] Progressed/Changed HEP based on:   [] positioning   [] body mechanics   [] transfers   [] heat/ice application    [] other:      Other Objective/Functional Measures:        Pain Level (0-10 scale) post treatment: 0    ASSESSMENT/Changes in Function:   Improved tolerance to TUCKER manual techniques compared to last visit. Pt reports feeling better after exercises and would like to try without E-stim today. Patient will continue to benefit from skilled PT services to modify and progress therapeutic interventions, address functional mobility deficits, address ROM deficits, address strength deficits, analyze and address soft tissue restrictions, analyze and cue movement patterns, analyze and modify body mechanics/ergonomics and assess and modify postural abnormalities to attain remaining goals. []  See Plan of Care  []  See progress note/recertification  []  See Discharge Summary         Progress towards goals / Updated goals:  Patient demonstrates good tolerance with interventions and required min verbal cues for proper reproduction.        PLAN  [x]  Upgrade activities as tolerated     [x]  Continue plan of care  []  Update interventions per flow sheet       []  Discharge due to:_  []  Other:_      Cali Montoya  , PTA   12/23/2019

## 2019-12-27 ENCOUNTER — HOSPITAL ENCOUNTER (OUTPATIENT)
Dept: PHYSICAL THERAPY | Age: 29
Discharge: HOME | End: 2019-12-27
Payer: SELF-PAY

## 2019-12-27 PROCEDURE — 97112 NEUROMUSCULAR REEDUCATION: CPT | Performed by: PHYSICAL THERAPIST

## 2019-12-27 PROCEDURE — 97140 MANUAL THERAPY 1/> REGIONS: CPT | Performed by: PHYSICAL THERAPIST

## 2019-12-27 NOTE — PROGRESS NOTES
PT DAILY TREATMENT NOTE 2-15    Patient Name: Mary Alice Babcock  Date:2019  : 1990  [x]  Patient  Verified  Payor: SELF PAY / Plan: [facility] SELF PAY / Product Type: Self Pay /    In time: 7:55 AM  Out time:8:40 AM  Total Treatment Time (min): 45  Visit #:  9    Treatment Area: Neck pain [M54.2]  Low back pain [M54.5]    SUBJECTIVE  Pain Level (0-10 scale): stiff  Any medication changes, allergies to medications, adverse drug reactions, diagnosis change, or new procedure performed?: [x] No    [] Yes (see summary sheet for update)  Subjective functional status/changes:   [] No changes reported  Patient reports that he has had no instance of L shoulder blade pain since his last visit. Today his neck is stiff, but otherwise he is doing well. OBJECTIVE      Modality rationale: Patient declined    Min Type Additional Details    []? Estim: []? Att   [x]? Unatt        []? TENS instruct                  []?IFC  [x]? Premod   []? NMES                     []?Other:  []?w/US   []?w/ice   [x]?w/heat  Position: L thoracic paraspinals, L lumbar paraspinals  Location:supine     []? Traction: []? Cervical       []? Lumbar                       []? Prone          []? Supine                       []?Intermittent   []? Continuous Lbs:  []? before manual  []? after manual  []?w/heat     []? Ultrasound: []? Continuous   []? Pulsed at:                            []? 1MHz   []? 3MHz Location:  W/cm2:     []? Paraffin         Location:  []?w/heat     []? Ice     []? Heat  []? Ice massage Position:  Location:     []? Laser  []? Other: Position:  Location:     []? Vasopneumatic Device Pressure:       []? lo []? med []? hi   Temperature:    [x]? Skin assessment post-treatment:  [x]? intact []? redness- no adverse reaction    []? redness  adverse reaction:      25 min Neuromuscular Re-education:  [x]?  See flow sheet :   Rationale: increase ROM, increase strength and improve coordination to improve the patients ability to look up without pain     20 min Manual Therapy: In supine (90/90 with wedge placed under pelvis):  L AIC  Infraclavicular pumping  B Sibson's  L pec release  R subclavius release   Rationale: decrease pain, increase ROM and increase tissue extensibility  to improve the patients ability to sit without pain            With   [] TE   [] TA   [] neuro   [] other: Patient Education: [x] Review HEP    [] Progressed/Changed HEP based on:   [] positioning   [] body mechanics   [] transfers   [] heat/ice application    [] other:      Other Objective/Functional Measures:    L horiz abd: WNL    Pain Level (0-10 scale) post treatment: 0    ASSESSMENT/Changes in Function:   Patient is improving his ability to orient his trunk to the right with both manual and non-manual TUCKER techniques    Patient will continue to benefit from skilled PT services to modify and progress therapeutic interventions, address functional mobility deficits, address ROM deficits, address strength deficits, analyze and address soft tissue restrictions, analyze and cue movement patterns, analyze and modify body mechanics/ergonomics and assess and modify postural abnormalities to attain remaining goals. []  See Plan of Care  []  See progress note/recertification  []  See Discharge Summary         Progress towards goals / Updated goals:  Patient is making excellent progress towards goals and is progressing well towards long term goals. PLAN  [x]  Upgrade activities as tolerated     [x]  Continue plan of care  []  Update interventions per flow sheet       []  Discharge due to:_  []  Other:_      Susan Baker, PT  , DPT, OCS, Cert.  DN     12/27/2019

## 2019-12-30 ENCOUNTER — HOSPITAL ENCOUNTER (OUTPATIENT)
Dept: PHYSICAL THERAPY | Age: 29
End: 2019-12-30
Payer: SELF-PAY

## 2020-01-02 ENCOUNTER — HOSPITAL ENCOUNTER (OUTPATIENT)
Dept: PHYSICAL THERAPY | Age: 30
Discharge: HOME | End: 2020-01-02
Payer: SELF-PAY

## 2020-01-02 PROCEDURE — 97140 MANUAL THERAPY 1/> REGIONS: CPT | Performed by: PHYSICAL THERAPIST

## 2020-01-02 PROCEDURE — 97112 NEUROMUSCULAR REEDUCATION: CPT | Performed by: PHYSICAL THERAPIST

## 2020-01-02 NOTE — PROGRESS NOTES
PT DAILY TREATMENT NOTE 2-15    Patient Name: Shahida Novak  Date:2020  : 1990  [x]  Patient  Verified  Payor: SELF PAY / Plan: [facility] SELF PAY / Product Type: Self Pay /    In time: 9:30 AM  Out time:10:15 AM  Total Treatment Time (min): 45  Visit #:  10    Treatment Area: Neck pain [M54.2]  Low back pain [M54.5]    SUBJECTIVE  Pain Level (0-10 scale): stiff  Any medication changes, allergies to medications, adverse drug reactions, diagnosis change, or new procedure performed?: [x] No    [] Yes (see summary sheet for update)  Subjective functional status/changes:   [] No changes reported  Patient reports no pain for several days after his last visit, but last night he had some soreness at the left shoulder blade while sitting on the couch last night. OBJECTIVE      Modality rationale: Patient declined    Min Type Additional Details    []? Estim: []? Att   [x]? Unatt        []? TENS instruct                  []?IFC  [x]? Premod   []? NMES                     []?Other:  []?w/US   []?w/ice   [x]?w/heat  Position: L thoracic paraspinals, L lumbar paraspinals  Location:supine     []? Traction: []? Cervical       []? Lumbar                       []? Prone          []? Supine                       []?Intermittent   []? Continuous Lbs:  []? before manual  []? after manual  []?w/heat     []? Ultrasound: []? Continuous   []? Pulsed at:                            []? 1MHz   []? 3MHz Location:  W/cm2:     []? Paraffin         Location:  []?w/heat     []? Ice     []? Heat  []? Ice massage Position:  Location:     []? Laser  []? Other: Position:  Location:     []? Vasopneumatic Device Pressure:       []? lo []? med []? hi   Temperature:    [x]? Skin assessment post-treatment:  [x]? intact []? redness- no adverse reaction    []? redness  adverse reaction:      25 min Neuromuscular Re-education:  [x]?  See flow sheet :   Rationale: increase ROM, increase strength and improve coordination to improve the patients ability to look up without pain     20 min Manual Therapy: In supine (90/90 with wedge placed under pelvis):  L AIC  Infraclavicular pumping  B Sibson's  L pec release  Grade III P/A and rotary mobilizations along C3-C7  C7-T2 A/P HVLAT manipulation   T3-T7 A/P HVLAT manipulation   Rationale: decrease pain, increase ROM and increase tissue extensibility  to improve the patients ability to sit without pain            With   [] TE   [] TA   [] neuro   [] other: Patient Education: [x] Review HEP    [] Progressed/Changed HEP based on:   [] positioning   [] body mechanics   [] transfers   [] heat/ice application    [] other:      Other Objective/Functional Measures:    Multiple cavitations with manipulations    Pain Level (0-10 scale) post treatment: 0    ASSESSMENT/Changes in Function:   Patient continues to show great tolerance to new TUCKER exercises and is improving segmental and ribcage mobility with each visit. Patient will continue to benefit from skilled PT services to modify and progress therapeutic interventions, address functional mobility deficits, address ROM deficits, address strength deficits, analyze and address soft tissue restrictions, analyze and cue movement patterns, analyze and modify body mechanics/ergonomics and assess and modify postural abnormalities to attain remaining goals. []  See Plan of Care  []  See progress note/recertification  []  See Discharge Summary         Progress towards goals / Updated goals:  Patient is doing very well overall and will likely discharge next week. PLAN  [x]  Upgrade activities as tolerated     [x]  Continue plan of care  []  Update interventions per flow sheet       []  Discharge due to:_  []  Other:_      Jony Price, PT  , DPT, OCS, Cert.  DN     1/2/2020

## 2020-01-02 NOTE — PROGRESS NOTES
[facility]  [address] [facility],  [address]  Phone: [phone]      Fax:  [phone]    Progress Note    Name: Luiza Pro   : 1990   MD: Parisa Syed MD       Treatment Diagnosis: Neck pain [M54.2]  Low back pain [M54.5]  Start of Care: 19    Visits from Start of Care: 10  Missed Visits: 0    Summary of Care:Mr. West Chang has shown a good response to therapy with a focus on manual therapy, therapeutic exercises, and modalities for pain control. ROM has improved, but he is still limited in lifting and standing capacity. He should continue to see good overall progress with an achievement of all long term goals over the next 4 weeks. Assessment / Recommendations:     Short Term Goals: To be accomplished in 8 treatments:  1. Patient will be able to bend forward and tie his shoes with <5/10 back pain. Met.  2. Patient will be able to demonstrate cervical AROM extension through a full ROM <5/10 neck pain. Met.  3. Patient will be able to sit for 30 minutes with <5/10 back pain. Met.  Long Term Goals: To be accomplished in 16 treatments:  1. Patient will be able to squat and  20# from the floor with no pain or limitation. Progressing towards goal.  2. Patient will be able to demonstrate WNL AROM cervical rotation in either direction to allow for improved tolerance to driving. Progressing towards goal.  3. Patient will be able to sit for an hour with no pain or limitation. Progressing towards goal.    Patient will continue to benefit from 2 visits/week for an additional 4 weeks. Melo Boswell, PT 2020    ________________________________________________________________________  NOTE TO PHYSICIAN:  Please complete the following and fax to: [facility]: [phone]  . Retain this original for your records.   If you are unable to process this request in 24 hours, please contact our office.        ____ I have read the above report and request that my patient continue therapy with the following changes/special instructions:  ____ I have read the above report and request that my patient be discharged from therapy    Physician's Signature:_________________ Date:___________Time:__________

## 2020-01-06 ENCOUNTER — HOSPITAL ENCOUNTER (OUTPATIENT)
Dept: PHYSICAL THERAPY | Age: 30
Discharge: HOME | End: 2020-01-06
Payer: SELF-PAY

## 2020-01-06 PROCEDURE — 97110 THERAPEUTIC EXERCISES: CPT

## 2020-01-06 PROCEDURE — 97140 MANUAL THERAPY 1/> REGIONS: CPT

## 2020-01-06 NOTE — PROGRESS NOTES
PT DAILY TREATMENT NOTE 2-15    Patient Name: Regla Hsieh  Date:2020  : 1990  [x]  Patient  Verified  Payor: SELF PAY / Plan: [facility] SELF PAY / Product Type: Self Pay /    In time: 9:30 AM  Out time:10:15 AM  Total Treatment Time (min): 45  Visit #:  11    Treatment Area: Neck pain [M54.2]  Low back pain [M54.5]    SUBJECTIVE  Pain Level (0-10 scale): stiff  Any medication changes, allergies to medications, adverse drug reactions, diagnosis change, or new procedure performed?: [x] No    [] Yes (see summary sheet for update)  Subjective functional status/changes:   [] No changes reported  Patient reports no pain for several days after his last visit, but last night he had some soreness at the left shoulder blade while sitting on the couch last night. OBJECTIVE      Modality rationale: Patient declined    Min Type Additional Details    []? Estim: []? Att   [x]? Unatt        []? TENS instruct                  []?IFC  [x]? Premod   []? NMES                     []?Other:  []?w/US   []?w/ice   [x]?w/heat  Position: L thoracic paraspinals, L lumbar paraspinals  Location:supine     []? Traction: []? Cervical       []? Lumbar                       []? Prone          []? Supine                       []?Intermittent   []? Continuous Lbs:  []? before manual  []? after manual  []?w/heat     []? Ultrasound: []? Continuous   []? Pulsed at:                            []? 1MHz   []? 3MHz Location:  W/cm2:     []? Paraffin         Location:  []?w/heat     []? Ice     []? Heat  []? Ice massage Position:  Location:     []? Laser  []? Other: Position:  Location:     []? Vasopneumatic Device Pressure:       []? lo []? med []? hi   Temperature:    [x]? Skin assessment post-treatment:  [x]? intact []? redness- no adverse reaction    []? redness  adverse reaction:      25 min Neuromuscular Re-education:  [x]?  See flow sheet :   Rationale: increase ROM, increase strength and improve coordination to improve the patients ability to look up without pain     20 min Manual Therapy:  L AIC, infraclavicular pumping, pec release, A/P and rotational mobs cervical spine, SOR with manual traction   Rationale: decrease pain, increase ROM and increase tissue extensibility  to improve the patients ability to sit without pain            With   [] TE   [] TA   [] neuro   [] other: Patient Education: [x] Review HEP    [] Progressed/Changed HEP based on:   [] positioning   [] body mechanics   [] transfers   [] heat/ice application    [] other:      Other Objective/Functional Measures:    Multiple cavitations with manipulations    Pain Level (0-10 scale) post treatment: 0    ASSESSMENT/Changes in Function:   Patient continues to show great tolerance to new TUCKER exercises and is improving segmental and ribcage mobility with each visit. Patient will continue to benefit from skilled PT services to modify and progress therapeutic interventions, address functional mobility deficits, address ROM deficits, address strength deficits, analyze and address soft tissue restrictions, analyze and cue movement patterns, analyze and modify body mechanics/ergonomics and assess and modify postural abnormalities to attain remaining goals. []  See Plan of Care  []  See progress note/recertification  []  See Discharge Summary         Progress towards goals / Updated goals:  Patient making good progress towards goals and demonstrates proper exercise reproduction with no verbal cues for correction.      PLAN  [x]  Upgrade activities as tolerated     [x]  Continue plan of care  []  Update interventions per flow sheet       []  Discharge due to:_  [x]  Other:_ Hold PT for MD recommendations per patient     Ana Cardona  PTA    1/6/2020

## 2020-01-07 ENCOUNTER — OFFICE VISIT (OUTPATIENT)
Dept: INTERNAL MEDICINE CLINIC | Age: 30
End: 2020-01-07

## 2020-01-07 VITALS
DIASTOLIC BLOOD PRESSURE: 84 MMHG | WEIGHT: 200 LBS | RESPIRATION RATE: 16 BRPM | SYSTOLIC BLOOD PRESSURE: 128 MMHG | OXYGEN SATURATION: 98 % | TEMPERATURE: 98.1 F | HEART RATE: 61 BPM | BODY MASS INDEX: 27.09 KG/M2 | HEIGHT: 72 IN

## 2020-01-07 DIAGNOSIS — S33.5XXS LUMBAR SPRAIN, SEQUELA: ICD-10-CM

## 2020-01-07 DIAGNOSIS — S13.9XXS NECK SPRAIN, SEQUELA: Primary | ICD-10-CM

## 2020-01-07 NOTE — PATIENT INSTRUCTIONS
[employer]ianDian Activation    Thank you for requesting access to FlexyMind. Please follow the instructions below to securely access and download your online medical record. FlexyMind allows you to send messages to your doctor, view your test results, renew your prescriptions, schedule appointments, and more. How Do I Sign Up? 1. In your internet browser, go to [email or web address]  2. Click on the First Time User? Click Here link in the Sign In box. You will be redirect to the New Member Sign Up page. 3. Enter your FlexyMind Access Code exactly as it appears below. You will not need to use this code after youve completed the sign-up process. If you do not sign up before the expiration date, you must request a new code. FlexyMind Access Code: Activation code not generated  Current FlexyMind Status: Active (This is the date your FlexyMind access code will )    4. Enter the last four digits of your Social Security Number (xxxx) and Date of Birth (mm/dd/yyyy) as indicated and click Submit. You will be taken to the next sign-up page. 5. Create a FlexyMind ID. This will be your FlexyMind login ID and cannot be changed, so think of one that is secure and easy to remember. 6. Create a FlexyMind password. You can change your password at any time. 7. Enter your Password Reset Question and Answer. This can be used at a later time if you forget your password. 8. Enter your e-mail address. You will receive e-mail notification when new information is available in [address] 9. Click Sign Up. You can now view and download portions of your medical record. 10. Click the Download Summary menu link to download a portable copy of your medical information. Additional Information    If you have questions, please visit the Frequently Asked Questions section of the FlexyMind website at [email or web address] Remember, FlexyMind is NOT to be used for urgent needs. For medical emergencies, dial 911.
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
## 2020-02-13 NOTE — PROGRESS NOTES
[address] [facility],  [address]  Phone: [phone]  Fax: [phone]    Discharge Summary  2-15    Patient name: Reuben Finn  : 1990  Provider#: [id number]  Referral source: Alaina Andrade MD      Medical/Treatment Diagnosis: Neck pain [M54.2]  Low back pain [M54.5]     Prior Hospitalization: see medical history     Comorbidities: See Plan of Care  Prior Level of Function:See Plan of Care  Medications: Verified on Patient Summary List    Start of Care: 19      Onset Date:19   Visits from Start of Care: 11     Missed Visits: 0  Reporting Period : 19 to 2020      ASSESSMENT/SUMMARY OF CARE: Mr. Matt Delgado did very well with PT with a focus on manual therapy and a gradual progression of therapeutic exercises to allow for greater improvement in ROM and tolerance to lifting. He achieved all long term goals and has not needed to return to the clinic over the past 6 weeks. Short Term Goals: To be accomplished in 8 treatments:  1. Patient will be able to bend forward and tie his shoes with <5/10 back pain. Met.  2. Patient will be able to demonstrate cervical AROM extension through a full ROM <5/10 neck pain. Met.  3. Patient will be able to sit for 30 minutes with <5/10 back pain. Met.  Long Term Goals: To be accomplished in 16 treatments:  1. Patient will be able to squat and  20# from the floor with no pain or limitation. Met.  2. Patient will be able to demonstrate WNL AROM cervical rotation in either direction to allow for improved tolerance to driving. Met.       RECOMMENDATIONS:  [x]Discontinue therapy: [x]Patient has reached or is progressing toward set goals      []Patient is non-compliant or has abdicated      []Due to lack of appreciable progress towards set goals      []Other    Jocelyn Richmond, PT 2020

## 2023-05-19 RX ORDER — CEPHALEXIN 500 MG/1
500 CAPSULE ORAL 4 TIMES DAILY
COMMUNITY
Start: 2019-12-10

## 2023-05-19 RX ORDER — CYCLOBENZAPRINE HCL 10 MG
10 TABLET ORAL 2 TIMES DAILY
COMMUNITY
Start: 2019-12-10

## 2023-05-19 RX ORDER — NAPROXEN 500 MG/1
500 TABLET ORAL 2 TIMES DAILY WITH MEALS
COMMUNITY
Start: 2019-12-10

## 2024-06-03 ENCOUNTER — APPOINTMENT (OUTPATIENT)
Facility: HOSPITAL | Age: 34
End: 2024-06-03

## 2024-06-03 ENCOUNTER — HOSPITAL ENCOUNTER (EMERGENCY)
Facility: HOSPITAL | Age: 34
Discharge: HOME | End: 2024-06-03
Attending: STUDENT IN AN ORGANIZED HEALTH CARE EDUCATION/TRAINING PROGRAM

## 2024-06-03 VITALS
OXYGEN SATURATION: 99 % | RESPIRATION RATE: 18 BRPM | WEIGHT: 181.88 LBS | HEIGHT: 72 IN | SYSTOLIC BLOOD PRESSURE: 156 MMHG | DIASTOLIC BLOOD PRESSURE: 97 MMHG | BODY MASS INDEX: 24.63 KG/M2 | TEMPERATURE: 98.1 F | HEART RATE: 58 BPM

## 2024-06-03 DIAGNOSIS — S16.1XXA CERVICAL STRAIN, INITIAL ENCOUNTER: ICD-10-CM
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
## 2024-06-03 DIAGNOSIS — S46.912A LEFT SHOULDER STRAIN, INITIAL ENCOUNTER: ICD-10-CM

## 2024-06-03 PROCEDURE — 73030 X-RAY EXAM OF SHOULDER: CPT

## 2024-06-03 PROCEDURE — 72040 X-RAY EXAM NECK SPINE 2-3 VW: CPT

## 2024-06-03 PROCEDURE — 99283 EMERGENCY DEPT VISIT LOW MDM: CPT

## 2024-06-03 RX ORDER — METHOCARBAMOL 750 MG/1
750 TABLET, FILM COATED ORAL 3 TIMES DAILY
Qty: 15 TABLET | Refills: 0 | Status: SHIPPED | OUTPATIENT
Start: 2024-06-03 | End: 2024-06-08

## 2024-06-03 RX ORDER — KETOROLAC TROMETHAMINE 10 MG/1
10 TABLET, FILM COATED ORAL 3 TIMES DAILY PRN
Qty: 15 TABLET | Refills: 0 | Status: SHIPPED | OUTPATIENT
Start: 2024-06-03

## 2024-06-03 ASSESSMENT — PAIN DESCRIPTION - ORIENTATION: ORIENTATION: LEFT

## 2024-06-03 ASSESSMENT — PAIN DESCRIPTION - ONSET: ONSET: SUDDEN

## 2024-06-03 ASSESSMENT — PAIN - FUNCTIONAL ASSESSMENT: PAIN_FUNCTIONAL_ASSESSMENT: PREVENTS OR INTERFERES SOME ACTIVE ACTIVITIES AND ADLS

## 2024-06-03 ASSESSMENT — ENCOUNTER SYMPTOMS
ABDOMINAL PAIN: 0
DIARRHEA: 0
COLOR CHANGE: 0
COUGH: 0
TROUBLE SWALLOWING: 0
NAUSEA: 0
VOMITING: 0
SHORTNESS OF BREATH: 0

## 2024-06-03 ASSESSMENT — PAIN DESCRIPTION - PAIN TYPE: TYPE: ACUTE PAIN

## 2024-06-03 ASSESSMENT — PAIN SCALES - GENERAL: PAINLEVEL_OUTOF10: 8

## 2024-06-03 ASSESSMENT — PAIN DESCRIPTION - DESCRIPTORS: DESCRIPTORS: SORE

## 2024-06-03 ASSESSMENT — PAIN DESCRIPTION - LOCATION: LOCATION: ARM

## 2024-06-03 ASSESSMENT — PAIN DESCRIPTION - FREQUENCY: FREQUENCY: CONTINUOUS
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
## 2024-06-12 ENCOUNTER — OFFICE VISIT (OUTPATIENT)
Facility: CLINIC | Age: 34
End: 2024-06-12

## 2024-06-12 VITALS
HEIGHT: 72 IN | TEMPERATURE: 98 F | BODY MASS INDEX: 23.98 KG/M2 | DIASTOLIC BLOOD PRESSURE: 86 MMHG | SYSTOLIC BLOOD PRESSURE: 120 MMHG | RESPIRATION RATE: 16 BRPM | WEIGHT: 177 LBS | HEART RATE: 64 BPM | OXYGEN SATURATION: 98 %

## 2024-06-12 DIAGNOSIS — S13.9XXA CERVICAL SPRAIN, INITIAL ENCOUNTER: Primary | ICD-10-CM

## 2024-06-12 DIAGNOSIS — S43.422A SPRAIN OF LEFT ROTATOR CUFF CAPSULE, INITIAL ENCOUNTER: ICD-10-CM

## 2024-06-12 LAB
ALBUMIN SERPL-MCNC: 4.2 G/DL (ref 3.5–5)
ALBUMIN/GLOB SERPL: 1.3 (ref 1.1–2.2)
ALP SERPL-CCNC: 62 U/L (ref 45–117)
ALT SERPL-CCNC: 29 U/L (ref 12–78)
ANION GAP SERPL CALC-SCNC: 3 MMOL/L (ref 5–15)
AST SERPL-CCNC: 18 U/L (ref 15–37)
BILIRUB SERPL-MCNC: 0.3 MG/DL (ref 0.2–1)
BUN SERPL-MCNC: 10 MG/DL (ref 6–20)
BUN/CREAT SERPL: 8 (ref 12–20)
CALCIUM SERPL-MCNC: 9.7 MG/DL (ref 8.5–10.1)
CHLORIDE SERPL-SCNC: 109 MMOL/L (ref 97–108)
CHOLEST SERPL-MCNC: 158 MG/DL
CO2 SERPL-SCNC: 29 MMOL/L (ref 21–32)
CREAT SERPL-MCNC: 1.25 MG/DL (ref 0.7–1.3)
ERYTHROCYTE [DISTWIDTH] IN BLOOD BY AUTOMATED COUNT: 12.4 % (ref 11.5–14.5)
GLOBULIN SER CALC-MCNC: 3.2 G/DL (ref 2–4)
GLUCOSE SERPL-MCNC: 106 MG/DL (ref 65–100)
HCT VFR BLD AUTO: 45.9 % (ref 36.6–50.3)
HDLC SERPL-MCNC: 64 MG/DL
HDLC SERPL: 2.5 (ref 0–5)
HGB BLD-MCNC: 15.3 G/DL (ref 12.1–17)
LDLC SERPL CALC-MCNC: 74.8 MG/DL (ref 0–100)
MCH RBC QN AUTO: 30.6 PG (ref 26–34)
MCHC RBC AUTO-ENTMCNC: 33.3 G/DL (ref 30–36.5)
MCV RBC AUTO: 91.8 FL (ref 80–99)
NRBC # BLD: 0 K/UL (ref 0–0.01)
NRBC BLD-RTO: 0 PER 100 WBC
PLATELET # BLD AUTO: 267 K/UL (ref 150–400)
PMV BLD AUTO: 9.9 FL (ref 8.9–12.9)
POTASSIUM SERPL-SCNC: 4 MMOL/L (ref 3.5–5.1)
PROT SERPL-MCNC: 7.4 G/DL (ref 6.4–8.2)
RBC # BLD AUTO: 5 M/UL (ref 4.1–5.7)
SODIUM SERPL-SCNC: 141 MMOL/L (ref 136–145)
TRIGL SERPL-MCNC: 96 MG/DL
VLDLC SERPL CALC-MCNC: 19.2 MG/DL
WBC # BLD AUTO: 7 K/UL (ref 4.1–11.1)

## 2024-06-12 PROCEDURE — 99204 OFFICE O/P NEW MOD 45 MIN: CPT | Performed by: INTERNAL MEDICINE

## 2024-06-12 RX ORDER — DICLOFENAC SODIUM 75 MG/1
75 TABLET, DELAYED RELEASE ORAL 2 TIMES DAILY
Qty: 60 TABLET | Refills: 3 | Status: SHIPPED | OUTPATIENT
Start: 2024-06-12

## 2024-06-12 ASSESSMENT — PATIENT HEALTH QUESTIONNAIRE - PHQ9
SUM OF ALL RESPONSES TO PHQ QUESTIONS 1-9: 1
SUM OF ALL RESPONSES TO PHQ9 QUESTIONS 1 & 2: 1
SUM OF ALL RESPONSES TO PHQ QUESTIONS 1-9: 1
2. FEELING DOWN, DEPRESSED OR HOPELESS: SEVERAL DAYS
1. LITTLE INTEREST OR PLEASURE IN DOING THINGS: NOT AT ALL

## 2024-06-12 NOTE — PATIENT INSTRUCTIONS
Thank you for enrolling in M2G. Please follow the instructions below to securely access your online medical record. [employer] allows you to send messages to your doctor, view your test results, renew your prescriptions, schedule appointments, and more.     How Do I Sign Up?  In your Internet browser, go to [email or web address]  Click on the Sign Up Now link in the Sign In box. You will see the New Member Sign Up page.  Enter your [employer] Access Code exactly as it appears below. You will not need to use this code after you’ve completed the sign-up process. If you do not sign up before the expiration date, you must request a new code.  M2G Access Code: Activation code not generated  Current M2G Status: Active    Enter your Social Security Number ([id number]) and Date of Birth (mm/dd/yyyy) as indicated and click Submit. You will be taken to the next sign-up page.  Create a M2G ID. This will be your M2G login ID and cannot be changed, so think of one that is secure and easy to remember.  Create a M2G password. You can change your password at any time.  Enter your Password Reset Question and Answer. This can be used at a later time if you forget your password.   Enter your e-mail address. You will receive e-mail notification when new information is available in M2G.  Click Sign Up. You can now view your medical record.     Additional Information  If you have questions, please contact your physician practice where you receive care. Remember, M2G is NOT to be used for urgent needs. For medical emergencies, dial 911.
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
## 2024-07-03 ENCOUNTER — HOSPITAL ENCOUNTER (OUTPATIENT)
Dept: PHYSICAL THERAPY | Facility: HOSPITAL | Age: 34
Setting detail: RECURRING SERIES
Discharge: HOME | End: 2024-07-06
Attending: INTERNAL MEDICINE

## 2024-07-03 PROCEDURE — 97535 SELF CARE MNGMENT TRAINING: CPT

## 2024-07-03 PROCEDURE — 97110 THERAPEUTIC EXERCISES: CPT

## 2024-07-03 PROCEDURE — 97016 VASOPNEUMATIC DEVICE THERAPY: CPT

## 2024-07-03 PROCEDURE — 97112 NEUROMUSCULAR REEDUCATION: CPT

## 2024-07-03 PROCEDURE — 97161 PT EVAL LOW COMPLEX 20 MIN: CPT
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
## 2024-07-08 ENCOUNTER — HOSPITAL ENCOUNTER (OUTPATIENT)
Dept: PHYSICAL THERAPY | Facility: HOSPITAL | Age: 34
Setting detail: RECURRING SERIES
Discharge: HOME | End: 2024-07-11
Attending: INTERNAL MEDICINE

## 2024-07-08 PROCEDURE — 97112 NEUROMUSCULAR REEDUCATION: CPT

## 2024-07-08 PROCEDURE — 97110 THERAPEUTIC EXERCISES: CPT

## 2024-07-08 PROCEDURE — 97016 VASOPNEUMATIC DEVICE THERAPY: CPT

## 2024-07-08 NOTE — PROGRESS NOTES
PHYSICAL THERAPY - DAILY TREATMENT NOTE (updated 3/23)      Date: 2024          Patient Name:  Glenn Lino :  1990   Medical   Diagnosis:  Cervical sprain, initial encounter [S13.9XXA]  Sprain of left rotator cuff capsule, initial encounter [S43.422A] Treatment Diagnosis:  M25.512  LEFT SHOULDER PAIN  and M54.2  NECK PAIN    Referral Source:  Clifton Riojas Jr., MD Insurance:   Payor: /                     Patient  verified yes     Visit #   Current  / Total 2 16   Time   In / Out 8:01a 10:00a   Total Treatment Time 46   Total Timed Codes 46         SUBJECTIVE    Pain Level (0-10 scale): 6/10    Any medication changes, allergies to medications, adverse drug reactions, diagnosis change, or new procedure performed?: [x] No    [] Yes (see summary sheet for update)  Medications: Verified on Patient Summary List    Subjective functional status/changes:     Pt reports that he was really sore after the initial evaluation. He reports having mild improvements in shoulder pain throughout the week, but continues to have soreness. He tolerated exercises well.     OBJECTIVE      Therapeutic Procedures:  Tx Min Billable or 1:1 Min (if diff from Tx Min) Procedure, Rationale, Specifics   23  [id number] Therapeutic Exercise (timed):  increase ROM, strength, coordination, balance, and proprioception to improve patient's ability to progress to PLOF and address remaining functional goals. (see flow sheet as applicable)     Details if applicable:       68088 Neuromuscular Re-Education (timed):  improve balance, coordination, kinesthetic sense, posture, core stability and proprioception to improve patient's ability to develop conscious control of individual muscles and awareness of position of extremities in order to progress to PLOF and address remaining functional goals. (see flow sheet as applicable)     Details if applicable:  Pt educated on KT tape benefits and shown where to purchase tape for home. Pt educated on

## 2024-07-10 ENCOUNTER — HOSPITAL ENCOUNTER (OUTPATIENT)
Dept: PHYSICAL THERAPY | Facility: HOSPITAL | Age: 34
Setting detail: RECURRING SERIES
Discharge: HOME | End: 2024-07-13
Attending: INTERNAL MEDICINE

## 2024-07-10 PROCEDURE — 97112 NEUROMUSCULAR REEDUCATION: CPT

## 2024-07-10 PROCEDURE — 97110 THERAPEUTIC EXERCISES: CPT

## 2024-07-10 PROCEDURE — 97140 MANUAL THERAPY 1/> REGIONS: CPT

## 2024-07-10 PROCEDURE — 97016 VASOPNEUMATIC DEVICE THERAPY: CPT

## 2024-07-10 NOTE — PROGRESS NOTES
anterior and posterior GHJ to provide stabilization of shoulder joint.    13  50068 Manual Therapy (timed):  decrease pain, increase ROM, increase tissue extensibility, and increase postural awareness to improve patient's ability to progress to PLOF and address remaining functional goals.  The manual therapy interventions were performed at a separate and distinct time from the therapeutic activities interventions . (see flow sheet as applicable)       Details if applicable:  Manual L rib compression with elongation of R side. GHJ AP mobs grade 2-3          Details if applicable:            Details if applicable:     46     Total Total         Modalities Rationale:     decrease edema, decrease inflammation, and decrease pain to improve patient's ability to progress to PLOF and address remaining functional goals.       min [] Estim Unattended,             type/location:       []  w/ice    []  w/heat        min [] Estim Attended,             type/location:       []  w/ice   []  w/heat         []  w/US   []  TENS insruct            min []  Mechanical Traction,        type/lbs:        []  pro      []  sup           []  int       []  cont            []  before manual           []  after manual     min []  Ultrasound,         settings/location:      min  unbilled []  Ice     []  Heat            location/position:    10      min [x]  Vasopneumatic Device,      press/temp:   pre-treatment girth :    post-treatment girth :    measured at (landmark       location) :   If using vaso (only need to measure limb vaso being performed on)    L shoulder - Med pressure     min []  Other:        Skin assessment post-treatment (if applicable):    [x]  intact    []  redness- no adverse reaction                 []redness - adverse reaction:          [x]  Patient Education billed concurrently with other procedures   [x] Review HEP    [] Progressed/Changed HEP, detail:    [] Other detail:         Other Objective/Functional Measures  TTP L

## 2024-07-16 ENCOUNTER — OFFICE VISIT (OUTPATIENT)
Facility: CLINIC | Age: 34
End: 2024-07-16

## 2024-07-16 ENCOUNTER — HOSPITAL ENCOUNTER (OUTPATIENT)
Dept: PHYSICAL THERAPY | Facility: HOSPITAL | Age: 34
Setting detail: RECURRING SERIES
Discharge: HOME | End: 2024-07-19
Attending: INTERNAL MEDICINE

## 2024-07-16 VITALS
BODY MASS INDEX: 23.84 KG/M2 | TEMPERATURE: 98 F | OXYGEN SATURATION: 99 % | DIASTOLIC BLOOD PRESSURE: 86 MMHG | SYSTOLIC BLOOD PRESSURE: 126 MMHG | HEIGHT: 72 IN | RESPIRATION RATE: 16 BRPM | WEIGHT: 176 LBS | HEART RATE: 62 BPM

## 2024-07-16 DIAGNOSIS — S13.9XXD CERVICAL SPRAIN, SUBSEQUENT ENCOUNTER: Primary | ICD-10-CM

## 2024-07-16 DIAGNOSIS — S43.422D SPRAIN OF LEFT ROTATOR CUFF CAPSULE, SUBSEQUENT ENCOUNTER: ICD-10-CM

## 2024-07-16 PROCEDURE — 97112 NEUROMUSCULAR REEDUCATION: CPT

## 2024-07-16 PROCEDURE — 97110 THERAPEUTIC EXERCISES: CPT

## 2024-07-16 PROCEDURE — 97140 MANUAL THERAPY 1/> REGIONS: CPT

## 2024-07-16 PROCEDURE — 99214 OFFICE O/P EST MOD 30 MIN: CPT | Performed by: INTERNAL MEDICINE

## 2024-07-16 SDOH — ECONOMIC STABILITY: FOOD INSECURITY: WITHIN THE PAST 12 MONTHS, THE FOOD YOU BOUGHT JUST DIDN'T LAST AND YOU DIDN'T HAVE MONEY TO GET MORE.: NEVER TRUE

## 2024-07-16 SDOH — ECONOMIC STABILITY: HOUSING INSECURITY
IN THE LAST 12 MONTHS, WAS THERE A TIME WHEN YOU DID NOT HAVE A STEADY PLACE TO SLEEP OR SLEPT IN A SHELTER (INCLUDING NOW)?: NO

## 2024-07-16 SDOH — ECONOMIC STABILITY: FOOD INSECURITY: WITHIN THE PAST 12 MONTHS, YOU WORRIED THAT YOUR FOOD WOULD RUN OUT BEFORE YOU GOT MONEY TO BUY MORE.: NEVER TRUE

## 2024-07-16 SDOH — ECONOMIC STABILITY: INCOME INSECURITY: HOW HARD IS IT FOR YOU TO PAY FOR THE VERY BASICS LIKE FOOD, HOUSING, MEDICAL CARE, AND HEATING?: NOT HARD AT ALL

## 2024-07-16 ASSESSMENT — PATIENT HEALTH QUESTIONNAIRE - PHQ9
1. LITTLE INTEREST OR PLEASURE IN DOING THINGS: NOT AT ALL
SUM OF ALL RESPONSES TO PHQ QUESTIONS 1-9: 0
SUM OF ALL RESPONSES TO PHQ QUESTIONS 1-9: 0
SUM OF ALL RESPONSES TO PHQ9 QUESTIONS 1 & 2: 0
2. FEELING DOWN, DEPRESSED OR HOPELESS: NOT AT ALL
SUM OF ALL RESPONSES TO PHQ QUESTIONS 1-9: 0
SUM OF ALL RESPONSES TO PHQ QUESTIONS 1-9: 0

## 2024-07-16 NOTE — PATIENT INSTRUCTIONS
Thank you for enrolling in [facility]. Please follow the instructions below to securely access your online medical record. [facility] allows you to send messages to your doctor, view your test results, renew your prescriptions, schedule appointments, and more.     How Do I Sign Up?  In your Internet browser, go to [email or web address]  Click on the Sign Up Now link in the Sign In box. You will see the New Member Sign Up page.  Enter your [facility] Access Code exactly as it appears below. You will not need to use this code after you’ve completed the sign-up process. If you do not sign up before the expiration date, you must request a new code.  [facility] Access Code: Activation code not generated  Current [facility] Status: Active    Enter your Social Security Number ([id number]) and Date of Birth (mm/dd/yyyy) as indicated and click Submit. You will be taken to the next sign-up page.  Create a [facility] ID. This will be your [facility] login ID and cannot be changed, so think of one that is secure and easy to remember.  Create a [facility] password. You can change your password at any time.  Enter your Password Reset Question and Answer. This can be used at a later time if you forget your password.   Enter your e-mail address. You will receive e-mail notification when new information is available in [facility].  Click Sign Up. You can now view your medical record.     Additional Information  If you have questions, please contact your physician practice where you receive care. Remember, [facility] is NOT to be used for urgent needs. For medical emergencies, dial 911.

## 2024-07-16 NOTE — PROGRESS NOTES
PHYSICAL THERAPY - DAILY TREATMENT NOTE (updated 3/23)      Date: 2024          Patient Name:  Glenn Lino :  1990   Medical   Diagnosis:  Cervical sprain, initial encounter [S13.9XXA]  Sprain of left rotator cuff capsule, initial encounter [S43.422A] Treatment Diagnosis:  M25.512  LEFT SHOULDER PAIN  and M54.2  NECK PAIN    Referral Source:  Clifton Riojas Jr., MD Insurance:   Payor: /                     Patient  verified yes     Visit #   Current  / Total 4 16   Time   In / Out 9:01a  9:47   Total Treatment Time 46   Total Timed Codes 46         SUBJECTIVE    Pain Level (0-10 scale): 5/10    Any medication changes, allergies to medications, adverse drug reactions, diagnosis change, or new procedure performed?: [x] No    [] Yes (see summary sheet for update)  Medications: Verified on Patient Summary List    Subjective functional status/changes:   Pt continues to have pain along the shoulder blade while at work and pain in the L pec after a day of work, he notices this pain more at rest or on his days off and feels he will wake up with more aching.     OBJECTIVE      Therapeutic Procedures:  Tx Min Billable or 1:1 Min (if diff from Tx Min) Procedure, Rationale, Specifics   13  06415 Therapeutic Exercise (timed):  increase ROM, strength, coordination, balance, and proprioception to improve patient's ability to progress to PLOF and address remaining functional goals. (see flow sheet as applicable)     Details if applicable:     10  69425 Neuromuscular Re-Education (timed):  improve balance, coordination, kinesthetic sense, posture, core stability and proprioception to improve patient's ability to develop conscious control of individual muscles and awareness of position of extremities in order to progress to PLOF and address remaining functional goals. (see flow sheet as applicable)     Details if applicable:  Shoulder stabilization and scapular stability exercises  as well as pec inhibition
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
## 2024-07-18 ENCOUNTER — HOSPITAL ENCOUNTER (OUTPATIENT)
Dept: PHYSICAL THERAPY | Facility: HOSPITAL | Age: 34
Setting detail: RECURRING SERIES
Discharge: HOME | End: 2024-07-21
Attending: INTERNAL MEDICINE

## 2024-07-18 PROCEDURE — 97110 THERAPEUTIC EXERCISES: CPT

## 2024-07-18 PROCEDURE — 97112 NEUROMUSCULAR REEDUCATION: CPT

## 2024-07-18 PROCEDURE — 97016 VASOPNEUMATIC DEVICE THERAPY: CPT

## 2024-07-18 NOTE — PROGRESS NOTES
PHYSICAL THERAPY - DAILY TREATMENT NOTE (updated 3/23)      Date: 2024          Patient Name:  Glenn Lino :  1990   Medical   Diagnosis:  Cervical sprain, initial encounter [S13.9XXA]  Sprain of left rotator cuff capsule, initial encounter [S43.422A] Treatment Diagnosis:  M25.512  LEFT SHOULDER PAIN  and M54.2  NECK PAIN    Referral Source:  Clifton Riojas Jr., MD Insurance:   Payor: /                     Patient  verified yes     Visit #   Current  / Total 5 16   Time   In / Out 8:45am 9:50   Total Treatment Time 65   Total Timed Codes 55         SUBJECTIVE    Pain Level (0-10 scale): 7/10    Any medication changes, allergies to medications, adverse drug reactions, diagnosis change, or new procedure performed?: [x] No    [] Yes (see summary sheet for update)  Medications: Verified on Patient Summary List    Subjective functional status/changes:   Pt reports that his shoulder was really sore after the manual work last session and he continues to have pain in the pec. He does feel like his strength is slowly improving.     OBJECTIVE      Therapeutic Procedures:  Tx Min Billable or 1:1 Min (if diff from Tx Min) Procedure, Rationale, Specifics   27  06035 Therapeutic Exercise (timed):  increase ROM, strength, coordination, balance, and proprioception to improve patient's ability to progress to PLOF and address remaining functional goals. (see flow sheet as applicable)     Details if applicable:     28  66191 Neuromuscular Re-Education (timed):  improve balance, coordination, kinesthetic sense, posture, core stability and proprioception to improve patient's ability to develop conscious control of individual muscles and awareness of position of extremities in order to progress to PLOF and address remaining functional goals. (see flow sheet as applicable)     Details if applicable:  KT tape applied to support Pec Major, Deltoid and Middle trap to assist with facilitation of scapular stabilizers.

## 2024-07-23 ENCOUNTER — HOSPITAL ENCOUNTER (OUTPATIENT)
Dept: PHYSICAL THERAPY | Facility: HOSPITAL | Age: 34
Setting detail: RECURRING SERIES
Discharge: HOME | End: 2024-07-26
Attending: INTERNAL MEDICINE

## 2024-07-23 PROCEDURE — 97016 VASOPNEUMATIC DEVICE THERAPY: CPT

## 2024-07-23 PROCEDURE — 97112 NEUROMUSCULAR REEDUCATION: CPT

## 2024-07-23 PROCEDURE — 97110 THERAPEUTIC EXERCISES: CPT

## 2024-07-23 PROCEDURE — 97530 THERAPEUTIC ACTIVITIES: CPT

## 2024-07-23 PROCEDURE — 97140 MANUAL THERAPY 1/> REGIONS: CPT

## 2024-07-23 NOTE — PROGRESS NOTES
PHYSICAL THERAPY - DAILY TREATMENT NOTE (updated 3/23)      Date: 2024          Patient Name:  Glenn Lino :  1990   Medical   Diagnosis:  Cervical sprain, initial encounter [S13.9XXA]  Sprain of left rotator cuff capsule, initial encounter [S43.422A] Treatment Diagnosis:  M25.512  LEFT SHOULDER PAIN  and M54.2  NECK PAIN    Referral Source:  Clifton Riojas Jr., MD Insurance:   Payor: /                     Patient  verified yes     Visit #   Current  / Total 6 16   Time   In / Out 8:47am 9:55   Total Treatment Time 68   Total Timed Codes 58         SUBJECTIVE    Pain Level (0-10 scale): 3/10    Any medication changes, allergies to medications, adverse drug reactions, diagnosis change, or new procedure performed?: [x] No    [] Yes (see summary sheet for update)  Medications: Verified on Patient Summary List    Subjective functional status/changes:   Pt arrives with less pain. He did a mild workout this weekend and notes a decrease in pain since then. He continues to have fatigue with OH motions.     OBJECTIVE      Therapeutic Procedures:  Tx Min Billable or 1:1 Min (if diff from Tx Min) Procedure, Rationale, Specifics   10  09374 Therapeutic Exercise (timed):  increase ROM, strength, coordination, balance, and proprioception to improve patient's ability to progress to PLOF and address remaining functional goals. (see flow sheet as applicable)     Details if applicable:     25  77336 Neuromuscular Re-Education (timed):  improve balance, coordination, kinesthetic sense, posture, core stability and proprioception to improve patient's ability to develop conscious control of individual muscles and awareness of position of extremities in order to progress to PLOF and address remaining functional goals. (see flow sheet as applicable)     Details if applicable:  KT tape applied to support lower trap activation Deltoid and RTC to assist with facilitation of scapular stabilizers and decrease pain.    10

## 2024-07-25 ENCOUNTER — HOSPITAL ENCOUNTER (OUTPATIENT)
Dept: PHYSICAL THERAPY | Facility: HOSPITAL | Age: 34
Setting detail: RECURRING SERIES
Discharge: HOME | End: 2024-07-28
Attending: INTERNAL MEDICINE

## 2024-07-25 PROCEDURE — 97112 NEUROMUSCULAR REEDUCATION: CPT

## 2024-07-25 PROCEDURE — 97140 MANUAL THERAPY 1/> REGIONS: CPT

## 2024-07-25 PROCEDURE — 97110 THERAPEUTIC EXERCISES: CPT

## 2024-07-25 NOTE — PROGRESS NOTES
PHYSICAL THERAPY - DAILY TREATMENT NOTE (updated 3/23)      Date: 2024          Patient Name:  Glenn Lino :  1990   Medical   Diagnosis:  Cervical sprain, initial encounter [S13.9XXA]  Sprain of left rotator cuff capsule, initial encounter [S43.422A] Treatment Diagnosis:  M25.512  LEFT SHOULDER PAIN  and M54.2  NECK PAIN    Referral Source:  Clifton Riojas Jr., MD Insurance:   Payor: /                     Patient  verified yes     Visit #   Current  / Total 7 16   Time   In / Out 8:47 9:43   Total Treatment Time 56   Total Timed Codes 46         SUBJECTIVE    Pain Level (0-10 scale): 3/10    Any medication changes, allergies to medications, adverse drug reactions, diagnosis change, or new procedure performed?: [x] No    [] Yes (see summary sheet for update)  Medications: Verified on Patient Summary List    Subjective functional status/changes:   Pt reports that his arm is feeling better, but he is just really sore. He attributes this to regular soreness from strengthening, but he also continues to have pain along the shoulder blade.     OBJECTIVE      Therapeutic Procedures:  Tx Min Billable or 1:1 Min (if diff from Tx Min) Procedure, Rationale, Specifics   10  77880 Therapeutic Exercise (timed):  increase ROM, strength, coordination, balance, and proprioception to improve patient's ability to progress to PLOF and address remaining functional goals. (see flow sheet as applicable)     Details if applicable:     13  98994 Neuromuscular Re-Education (timed):  improve balance, coordination, kinesthetic sense, posture, core stability and proprioception to improve patient's ability to develop conscious control of individual muscles and awareness of position of extremities in order to progress to PLOF and address remaining functional goals. (see flow sheet as applicable)     Details if applicable:  Serratus activation to promote scapular mobility and inhibition of overactive middle trap.    23  68999

## 2024-07-30 ENCOUNTER — HOSPITAL ENCOUNTER (OUTPATIENT)
Dept: PHYSICAL THERAPY | Facility: HOSPITAL | Age: 34
Setting detail: RECURRING SERIES
Discharge: HOME | End: 2024-08-02
Attending: INTERNAL MEDICINE

## 2024-07-30 PROCEDURE — 97530 THERAPEUTIC ACTIVITIES: CPT

## 2024-07-30 PROCEDURE — 97112 NEUROMUSCULAR REEDUCATION: CPT

## 2024-07-30 PROCEDURE — 97016 VASOPNEUMATIC DEVICE THERAPY: CPT

## 2024-07-30 PROCEDURE — 97110 THERAPEUTIC EXERCISES: CPT

## 2024-07-30 NOTE — PROGRESS NOTES
PHYSICAL THERAPY - DAILY TREATMENT NOTE (updated 3/23)      Date: 2024          Patient Name:  Glenn Lino :  1990   Medical   Diagnosis:  Cervical sprain, initial encounter [S13.9XXA]  Sprain of left rotator cuff capsule, initial encounter [S43.422A] Treatment Diagnosis:  M25.512  LEFT SHOULDER PAIN  and M54.2  NECK PAIN    Referral Source:  Clifton Riojas Jr., MD Insurance:   Payor: /                     Patient  verified yes     Visit #   Current  / Total 8 16   Time   In / Out 8:50 am 9:48 am   Total Treatment Time 58   Total Timed Codes 48         SUBJECTIVE    Pain Level (0-10 scale): 2/10    Any medication changes, allergies to medications, adverse drug reactions, diagnosis change, or new procedure performed?: [x] No    [] Yes (see summary sheet for update)  Medications: Verified on Patient Summary List    Subjective functional status/changes:   Pt reports that his shoulder is feeling much better. He did another work out this weekend with his dad and played ping ping with no increase in symptoms. He feels like his pain has improved since the hands on last session. He noticed feeling significantly looser after last session.      OBJECTIVE    Previous Session:   Manual: Shoulder PROM paired with manual MFR of L Lat and AP mobs grade 2-3 to L GHJ. Scapular distraction with gapping technique pt in side lying.     Therapeutic Procedures:  Tx Min Billable or 1:1 Min (if diff from Tx Min) Procedure, Rationale, Specifics   10  95853 Therapeutic Exercise (timed):  increase ROM, strength, coordination, balance, and proprioception to improve patient's ability to progress to PLOF and address remaining functional goals. (see flow sheet as applicable)     Details if applicable:     38 35710 Neuromuscular Re-Education (timed):  improve balance, coordination, kinesthetic sense, posture, core stability and proprioception to improve patient's ability to develop conscious control of individual muscles and

## 2024-08-01 ENCOUNTER — HOSPITAL ENCOUNTER (OUTPATIENT)
Dept: PHYSICAL THERAPY | Facility: HOSPITAL | Age: 34
Setting detail: RECURRING SERIES
Discharge: HOME | End: 2024-08-04
Attending: INTERNAL MEDICINE

## 2024-08-01 PROCEDURE — 97140 MANUAL THERAPY 1/> REGIONS: CPT

## 2024-08-01 PROCEDURE — 97110 THERAPEUTIC EXERCISES: CPT

## 2024-08-01 PROCEDURE — 97112 NEUROMUSCULAR REEDUCATION: CPT

## 2024-08-01 PROCEDURE — 97016 VASOPNEUMATIC DEVICE THERAPY: CPT

## 2024-08-01 PROCEDURE — 97530 THERAPEUTIC ACTIVITIES: CPT

## 2024-08-01 NOTE — PROGRESS NOTES
PHYSICAL THERAPY - DAILY TREATMENT NOTE (updated 3/23)      Date: 2024          Patient Name:  Glenn Lino :  1990   Medical   Diagnosis:  Cervical sprain, initial encounter [S13.9XXA]  Sprain of left rotator cuff capsule, initial encounter [S43.422A] Treatment Diagnosis:  M25.512  LEFT SHOULDER PAIN  and M54.2  NECK PAIN    Referral Source:  Clifton Riojas Jr., MD Insurance:   Payor: /                     Patient  verified yes     Visit #   Current  / Total 9 16   Time   In / Out 8:51 am 9:52 am   Total Treatment Time 61   Total Timed Codes 51         SUBJECTIVE    Pain Level (0-10 scale): 0/10 \"just regular sore from the workout\"     Any medication changes, allergies to medications, adverse drug reactions, diagnosis change, or new procedure performed?: [x] No    [] Yes (see summary sheet for update)  Medications: Verified on Patient Summary List    Subjective functional status/changes:   Pt reports no symptoms in the L shoulder, just regular soreness from the workout. He suspect that the [employer] work is what made his sore. He feels he is tolerating progressions well and has less pain along the shoulder blade.      OBJECTIVE    Previous Session:       Therapeutic Procedures:  Tx Min Billable or 1:1 Min (if diff from Tx Min) Procedure, Rationale, Specifics   10  65291 Therapeutic Exercise (timed):  increase ROM, strength, coordination, balance, and proprioception to improve patient's ability to progress to PLOF and address remaining functional goals. (see flow sheet as applicable)     Details if applicable:     15  07626 Neuromuscular Re-Education (timed):  improve balance, coordination, kinesthetic sense, posture, core stability and proprioception to improve patient's ability to develop conscious control of individual muscles and awareness of position of extremities in order to progress to PLOF and address remaining functional goals. (see flow sheet as applicable)     Details if applicable:

## 2024-08-19 ENCOUNTER — OFFICE VISIT (OUTPATIENT)
Facility: CLINIC | Age: 34
End: 2024-08-19

## 2024-08-19 VITALS
OXYGEN SATURATION: 98 % | BODY MASS INDEX: 24.24 KG/M2 | SYSTOLIC BLOOD PRESSURE: 114 MMHG | RESPIRATION RATE: 16 BRPM | HEART RATE: 60 BPM | DIASTOLIC BLOOD PRESSURE: 86 MMHG | TEMPERATURE: 98 F | HEIGHT: 72 IN | WEIGHT: 179 LBS

## 2024-08-19 DIAGNOSIS — S43.402S SPRAIN OF LEFT SHOULDER, UNSPECIFIED SHOULDER SPRAIN TYPE, SEQUELA: ICD-10-CM

## 2024-08-19 DIAGNOSIS — S13.9XXS CERVICAL SPRAIN, SEQUELA: Primary | ICD-10-CM

## 2024-08-19 PROCEDURE — 99214 OFFICE O/P EST MOD 30 MIN: CPT | Performed by: INTERNAL MEDICINE

## 2024-08-19 ASSESSMENT — PATIENT HEALTH QUESTIONNAIRE - PHQ9
2. FEELING DOWN, DEPRESSED OR HOPELESS: NOT AT ALL
SUM OF ALL RESPONSES TO PHQ QUESTIONS 1-9: 0
1. LITTLE INTEREST OR PLEASURE IN DOING THINGS: NOT AT ALL
SUM OF ALL RESPONSES TO PHQ9 QUESTIONS 1 & 2: 0
SUM OF ALL RESPONSES TO PHQ QUESTIONS 1-9: 0
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
